# Patient Record
Sex: FEMALE | Race: WHITE | NOT HISPANIC OR LATINO | Employment: OTHER | URBAN - NONMETROPOLITAN AREA
[De-identification: names, ages, dates, MRNs, and addresses within clinical notes are randomized per-mention and may not be internally consistent; named-entity substitution may affect disease eponyms.]

---

## 2018-03-30 ENCOUNTER — OFFICE VISIT CONVERTED (OUTPATIENT)
Dept: FAMILY MEDICINE CLINIC | Age: 63
End: 2018-03-30
Attending: FAMILY MEDICINE

## 2018-06-25 ENCOUNTER — OFFICE VISIT CONVERTED (OUTPATIENT)
Dept: FAMILY MEDICINE CLINIC | Age: 63
End: 2018-06-25
Attending: FAMILY MEDICINE

## 2018-12-03 ENCOUNTER — OFFICE VISIT CONVERTED (OUTPATIENT)
Dept: FAMILY MEDICINE CLINIC | Age: 63
End: 2018-12-03
Attending: FAMILY MEDICINE

## 2018-12-28 ENCOUNTER — OFFICE VISIT CONVERTED (OUTPATIENT)
Dept: FAMILY MEDICINE CLINIC | Age: 63
End: 2018-12-28
Attending: NURSE PRACTITIONER

## 2019-03-04 ENCOUNTER — HOSPITAL ENCOUNTER (OUTPATIENT)
Dept: OTHER | Facility: HOSPITAL | Age: 64
Discharge: HOME | End: 2019-03-04

## 2019-03-04 ENCOUNTER — OFFICE VISIT CONVERTED (OUTPATIENT)
Dept: FAMILY MEDICINE CLINIC | Age: 64
End: 2019-03-04
Attending: FAMILY MEDICINE

## 2019-05-07 ENCOUNTER — OFFICE VISIT CONVERTED (OUTPATIENT)
Dept: NEUROLOGY | Facility: CLINIC | Age: 64
End: 2019-05-07
Attending: PSYCHIATRY & NEUROLOGY

## 2019-05-07 ENCOUNTER — CONVERSION ENCOUNTER (OUTPATIENT)
Dept: NEUROLOGY | Facility: CLINIC | Age: 64
End: 2019-05-07

## 2019-05-24 ENCOUNTER — HOSPITAL ENCOUNTER (OUTPATIENT)
Dept: OTHER | Facility: HOSPITAL | Age: 64
Discharge: HOME | End: 2019-05-24

## 2019-05-24 LAB
ALBUMIN SERPL-MCNC: 4.3 G/DL (ref 3.5–5)
ALBUMIN/GLOB SERPL: 1.5 {RATIO} (ref 1.4–2.6)
ALP SERPL-CCNC: 57 U/L (ref 43–160)
ALT SERPL-CCNC: 21 U/L (ref 10–40)
ANION GAP SERPL CALC-SCNC: 19 MMOL/L (ref 8–19)
AST SERPL-CCNC: 20 U/L (ref 15–50)
BASOPHILS # BLD AUTO: 0.05 10*3/UL (ref 0–0.2)
BASOPHILS NFR BLD AUTO: 0.8 % (ref 0–3)
BILIRUB SERPL-MCNC: 0.47 MG/DL (ref 0.2–1.3)
BUN SERPL-MCNC: 23 MG/DL (ref 5–25)
BUN/CREAT SERPL: 26 {RATIO} (ref 6–20)
CALCIUM SERPL-MCNC: 9.7 MG/DL (ref 8.7–10.4)
CHLORIDE SERPL-SCNC: 105 MMOL/L (ref 99–111)
CONV ABS IMM GRAN: 0.02 10*3/UL (ref 0–0.2)
CONV CO2: 24 MMOL/L (ref 22–32)
CONV IMMATURE GRAN: 0.3 % (ref 0–1.8)
CONV TOTAL PROTEIN: 7.2 G/DL (ref 6.3–8.2)
CREAT UR-MCNC: 0.88 MG/DL (ref 0.5–0.9)
DEPRECATED RDW RBC AUTO: 40.9 FL (ref 36.4–46.3)
EOSINOPHIL # BLD AUTO: 0.15 10*3/UL (ref 0–0.7)
EOSINOPHIL # BLD AUTO: 2.3 % (ref 0–7)
ERYTHROCYTE [DISTWIDTH] IN BLOOD BY AUTOMATED COUNT: 12.3 % (ref 11.7–14.4)
GFR SERPLBLD BASED ON 1.73 SQ M-ARVRAT: >60 ML/MIN/{1.73_M2}
GLOBULIN UR ELPH-MCNC: 2.9 G/DL (ref 2–3.5)
GLUCOSE SERPL-MCNC: 153 MG/DL (ref 65–99)
HBA1C MFR BLD: 13.4 G/DL (ref 12–16)
HCT VFR BLD AUTO: 41.2 % (ref 37–47)
HCYS SERPL-SCNC: 12.3 UMOL/L (ref 3.7–13.9)
LYMPHOCYTES # BLD AUTO: 2.64 10*3/UL (ref 1–5)
MCH RBC QN AUTO: 29.5 PG (ref 27–31)
MCHC RBC AUTO-ENTMCNC: 32.5 G/DL (ref 33–37)
MCV RBC AUTO: 90.5 FL (ref 81–99)
MONOCYTES # BLD AUTO: 0.47 10*3/UL (ref 0.2–1.2)
MONOCYTES NFR BLD AUTO: 7.1 % (ref 3–10)
NEUTROPHILS # BLD AUTO: 3.3 10*3/UL (ref 2–8)
NEUTROPHILS NFR BLD AUTO: 49.7 % (ref 30–85)
NRBC CBCN: 0 % (ref 0–0.7)
OSMOLALITY SERPL CALC.SUM OF ELEC: 305 MOSM/KG (ref 273–304)
PLATELET # BLD AUTO: 268 10*3/UL (ref 130–400)
PMV BLD AUTO: 10.7 FL (ref 9.4–12.3)
POTASSIUM SERPL-SCNC: 3.8 MMOL/L (ref 3.5–5.3)
RBC # BLD AUTO: 4.55 10*6/UL (ref 4.2–5.4)
SODIUM SERPL-SCNC: 144 MMOL/L (ref 135–147)
TSH SERPL-ACNC: 1.55 M[IU]/L (ref 0.27–4.2)
VARIANT LYMPHS NFR BLD MANUAL: 39.8 % (ref 20–45)
VIT B12 SERPL-MCNC: 310 PG/ML (ref 211–911)
WBC # BLD AUTO: 6.63 10*3/UL (ref 4.8–10.8)

## 2019-09-06 ENCOUNTER — OFFICE VISIT CONVERTED (OUTPATIENT)
Dept: FAMILY MEDICINE CLINIC | Age: 64
End: 2019-09-06
Attending: FAMILY MEDICINE

## 2019-09-06 ENCOUNTER — HOSPITAL ENCOUNTER (OUTPATIENT)
Dept: OTHER | Facility: HOSPITAL | Age: 64
Discharge: HOME | End: 2019-09-06
Attending: FAMILY MEDICINE

## 2019-09-06 LAB
25(OH)D3 SERPL-MCNC: 33.6 NG/ML (ref 30–100)
ALBUMIN SERPL-MCNC: 4.4 G/DL (ref 3.5–5)
ALBUMIN/GLOB SERPL: 1.6 {RATIO} (ref 1.4–2.6)
ALP SERPL-CCNC: 58 U/L (ref 43–160)
ALT SERPL-CCNC: 24 U/L (ref 10–40)
ANION GAP SERPL CALC-SCNC: 18 MMOL/L (ref 8–19)
AST SERPL-CCNC: 20 U/L (ref 15–50)
BASOPHILS # BLD MANUAL: 0.03 10*3/UL (ref 0–0.2)
BASOPHILS NFR BLD MANUAL: 0.4 % (ref 0–3)
BILIRUB SERPL-MCNC: 0.44 MG/DL (ref 0.2–1.3)
BUN SERPL-MCNC: 19 MG/DL (ref 5–25)
BUN/CREAT SERPL: 23 {RATIO} (ref 6–20)
CALCIUM SERPL-MCNC: 9.8 MG/DL (ref 8.7–10.4)
CHLORIDE SERPL-SCNC: 104 MMOL/L (ref 99–111)
CHOLEST SERPL-MCNC: 166 MG/DL (ref 107–200)
CHOLEST/HDLC SERPL: 3.2 {RATIO} (ref 3–6)
CONV CO2: 25 MMOL/L (ref 22–32)
CONV TOTAL PROTEIN: 7.1 G/DL (ref 6.3–8.2)
CREAT UR-MCNC: 0.82 MG/DL (ref 0.5–0.9)
DEPRECATED RDW RBC AUTO: 39.6 FL
EOSINOPHIL # BLD MANUAL: 0.2 10*3/UL (ref 0–0.7)
EOSINOPHIL NFR BLD MANUAL: 2.9 % (ref 0–7)
ERYTHROCYTE [DISTWIDTH] IN BLOOD BY AUTOMATED COUNT: 12 % (ref 11.5–14.5)
GFR SERPLBLD BASED ON 1.73 SQ M-ARVRAT: >60 ML/MIN/{1.73_M2}
GLOBULIN UR ELPH-MCNC: 2.7 G/DL (ref 2–3.5)
GLUCOSE SERPL-MCNC: 142 MG/DL (ref 65–99)
GRANS (ABSOLUTE): 3.03 10*3/UL (ref 2–8)
GRANS: 44.3 % (ref 30–85)
HBA1C MFR BLD: 13.7 G/DL (ref 12–16)
HCT VFR BLD AUTO: 40.5 % (ref 37–47)
HDLC SERPL-MCNC: 52 MG/DL (ref 40–60)
IMM GRANULOCYTES # BLD: 0.02 10*3/UL (ref 0–0.54)
IMM GRANULOCYTES NFR BLD: 0.3 % (ref 0–0.43)
LDLC SERPL CALC-MCNC: 86 MG/DL (ref 70–100)
LYMPHOCYTES # BLD MANUAL: 3.02 10*3/UL (ref 1–5)
LYMPHOCYTES NFR BLD MANUAL: 7.9 % (ref 3–10)
MCH RBC QN AUTO: 29.9 PG (ref 27–31)
MCHC RBC AUTO-ENTMCNC: 33.8 G/DL (ref 33–37)
MCV RBC AUTO: 88.4 FL (ref 81–99)
MONOCYTES # BLD AUTO: 0.54 10*3/UL (ref 0.2–1.2)
OSMOLALITY SERPL CALC.SUM OF ELEC: 301 MOSM/KG (ref 273–304)
PLATELET # BLD AUTO: 289 10*3/UL (ref 130–400)
PMV BLD AUTO: 10.2 FL (ref 7.4–10.4)
POTASSIUM SERPL-SCNC: 4 MMOL/L (ref 3.5–5.3)
RBC # BLD AUTO: 4.58 10*6/UL (ref 4.2–5.4)
SODIUM SERPL-SCNC: 143 MMOL/L (ref 135–147)
TRIGL SERPL-MCNC: 141 MG/DL (ref 40–150)
TSH SERPL-ACNC: 1.9 M[IU]/L (ref 0.27–4.2)
VARIANT LYMPHS NFR BLD MANUAL: 44.2 % (ref 20–45)
VLDLC SERPL-MCNC: 28 MG/DL (ref 5–37)
WBC # BLD AUTO: 6.84 10*3/UL (ref 4.8–10.8)

## 2019-10-09 ENCOUNTER — OFFICE VISIT CONVERTED (OUTPATIENT)
Dept: NEUROLOGY | Facility: CLINIC | Age: 64
End: 2019-10-09
Attending: PSYCHIATRY & NEUROLOGY

## 2019-10-10 ENCOUNTER — HOSPITAL ENCOUNTER (OUTPATIENT)
Dept: OTHER | Facility: HOSPITAL | Age: 64
Discharge: HOME | End: 2019-10-10
Attending: FAMILY MEDICINE

## 2019-10-10 LAB
ANION GAP SERPL CALC-SCNC: 18 MMOL/L (ref 8–19)
BUN SERPL-MCNC: 20 MG/DL (ref 5–25)
BUN/CREAT SERPL: 22 {RATIO} (ref 6–20)
CALCIUM SERPL-MCNC: 9.9 MG/DL (ref 8.7–10.4)
CHLORIDE SERPL-SCNC: 105 MMOL/L (ref 99–111)
CONV CO2: 24 MMOL/L (ref 22–32)
CREAT UR-MCNC: 0.89 MG/DL (ref 0.5–0.9)
EST. AVERAGE GLUCOSE BLD GHB EST-MCNC: 148 MG/DL
GFR SERPLBLD BASED ON 1.73 SQ M-ARVRAT: >60 ML/MIN/{1.73_M2}
GLUCOSE SERPL-MCNC: 146 MG/DL (ref 65–99)
HBA1C MFR BLD: 6.8 % (ref 3.5–5.7)
OSMOLALITY SERPL CALC.SUM OF ELEC: 301 MOSM/KG (ref 273–304)
POTASSIUM SERPL-SCNC: 4 MMOL/L (ref 3.5–5.3)
SODIUM SERPL-SCNC: 143 MMOL/L (ref 135–147)

## 2019-12-23 ENCOUNTER — HOSPITAL ENCOUNTER (OUTPATIENT)
Dept: OTHER | Facility: HOSPITAL | Age: 64
Discharge: HOME | End: 2019-12-23

## 2019-12-23 LAB
ANION GAP SERPL CALC-SCNC: 18 MMOL/L (ref 8–19)
BUN SERPL-MCNC: 15 MG/DL (ref 5–25)
BUN/CREAT SERPL: 18 {RATIO} (ref 6–20)
CALCIUM SERPL-MCNC: 10 MG/DL (ref 8.7–10.4)
CHLORIDE SERPL-SCNC: 103 MMOL/L (ref 99–111)
CONV CO2: 27 MMOL/L (ref 22–32)
CREAT BLD-MCNC: 0.9 MG/DL (ref 0.6–1.4)
CREAT UR-MCNC: 0.84 MG/DL (ref 0.5–0.9)
GFR SERPLBLD BASED ON 1.73 SQ M-ARVRAT: >60 ML/MIN/{1.73_M2}
GFR SERPLBLD BASED ON 1.73 SQ M-ARVRAT: >60 ML/MIN/{1.73_M2}
GLUCOSE SERPL-MCNC: 123 MG/DL (ref 65–99)
OSMOLALITY SERPL CALC.SUM OF ELEC: 300 MOSM/KG (ref 273–304)
POTASSIUM SERPL-SCNC: 4.1 MMOL/L (ref 3.5–5.3)
SODIUM SERPL-SCNC: 144 MMOL/L (ref 135–147)

## 2020-01-16 ENCOUNTER — HOSPITAL ENCOUNTER (OUTPATIENT)
Dept: DIABETES SERVICES | Facility: HOSPITAL | Age: 65
Setting detail: RECURRING SERIES
Discharge: HOME | End: 2020-01-31
Attending: FAMILY MEDICINE

## 2020-01-16 ENCOUNTER — OFFICE VISIT CONVERTED (OUTPATIENT)
Dept: FAMILY MEDICINE CLINIC | Age: 65
End: 2020-01-16
Attending: FAMILY MEDICINE

## 2020-01-17 ENCOUNTER — OFFICE VISIT CONVERTED (OUTPATIENT)
Dept: FAMILY MEDICINE CLINIC | Age: 65
End: 2020-01-17
Attending: NURSE PRACTITIONER

## 2020-02-29 ENCOUNTER — HOSPITAL ENCOUNTER (OUTPATIENT)
Dept: OTHER | Facility: HOSPITAL | Age: 65
Discharge: HOME | End: 2020-02-29
Attending: FAMILY MEDICINE

## 2020-02-29 LAB
ALBUMIN SERPL-MCNC: 4.3 G/DL (ref 3.5–5)
ALBUMIN/GLOB SERPL: 1.5 {RATIO} (ref 1.4–2.6)
ALP SERPL-CCNC: 52 U/L (ref 43–160)
ALT SERPL-CCNC: 20 U/L (ref 10–40)
ANION GAP SERPL CALC-SCNC: 17 MMOL/L (ref 8–19)
AST SERPL-CCNC: 18 U/L (ref 15–50)
BILIRUB SERPL-MCNC: 0.39 MG/DL (ref 0.2–1.3)
BUN SERPL-MCNC: 17 MG/DL (ref 5–25)
BUN/CREAT SERPL: 20 {RATIO} (ref 6–20)
CALCIUM SERPL-MCNC: 9.4 MG/DL (ref 8.7–10.4)
CHLORIDE SERPL-SCNC: 104 MMOL/L (ref 99–111)
CHOLEST SERPL-MCNC: 136 MG/DL (ref 107–200)
CHOLEST/HDLC SERPL: 2.5 {RATIO} (ref 3–6)
CONV CO2: 26 MMOL/L (ref 22–32)
CONV CREATININE URINE, RANDOM: 70.4 MG/DL (ref 10–300)
CONV MICROALBUM.,U,RANDOM: <12 MG/L (ref 0–20)
CONV TOTAL PROTEIN: 7.1 G/DL (ref 6.3–8.2)
CREAT UR-MCNC: 0.83 MG/DL (ref 0.5–0.9)
EST. AVERAGE GLUCOSE BLD GHB EST-MCNC: 148 MG/DL
GFR SERPLBLD BASED ON 1.73 SQ M-ARVRAT: >60 ML/MIN/{1.73_M2}
GLOBULIN UR ELPH-MCNC: 2.8 G/DL (ref 2–3.5)
GLUCOSE SERPL-MCNC: 125 MG/DL (ref 65–99)
HBA1C MFR BLD: 6.8 % (ref 3.5–5.7)
HDLC SERPL-MCNC: 55 MG/DL (ref 40–60)
LDLC SERPL CALC-MCNC: 64 MG/DL (ref 70–100)
MICROALBUMIN/CREAT UR: 17 MG/G{CRE} (ref 0–35)
OSMOLALITY SERPL CALC.SUM OF ELEC: 299 MOSM/KG (ref 273–304)
POTASSIUM SERPL-SCNC: 3.9 MMOL/L (ref 3.5–5.3)
SODIUM SERPL-SCNC: 143 MMOL/L (ref 135–147)
TRIGL SERPL-MCNC: 87 MG/DL (ref 40–150)
VLDLC SERPL-MCNC: 17 MG/DL (ref 5–37)

## 2020-04-09 ENCOUNTER — TELEMEDICINE CONVERTED (OUTPATIENT)
Dept: NEUROLOGY | Facility: CLINIC | Age: 65
End: 2020-04-09
Attending: PSYCHIATRY & NEUROLOGY

## 2020-07-16 ENCOUNTER — OFFICE VISIT CONVERTED (OUTPATIENT)
Dept: FAMILY MEDICINE CLINIC | Age: 65
End: 2020-07-16
Attending: FAMILY MEDICINE

## 2020-07-16 ENCOUNTER — HOSPITAL ENCOUNTER (OUTPATIENT)
Dept: OTHER | Facility: HOSPITAL | Age: 65
Discharge: HOME | End: 2020-07-16
Attending: FAMILY MEDICINE

## 2020-07-16 LAB
ALBUMIN SERPL-MCNC: 4.3 G/DL (ref 3.5–5)
ALBUMIN/GLOB SERPL: 1.7 {RATIO} (ref 1.4–2.6)
ALP SERPL-CCNC: 47 U/L (ref 43–160)
ALT SERPL-CCNC: 19 U/L (ref 10–40)
ANION GAP SERPL CALC-SCNC: 13 MMOL/L (ref 8–19)
AST SERPL-CCNC: 16 U/L (ref 15–50)
BILIRUB SERPL-MCNC: 0.49 MG/DL (ref 0.2–1.3)
BUN SERPL-MCNC: 16 MG/DL (ref 5–25)
BUN/CREAT SERPL: 19 {RATIO} (ref 6–20)
CALCIUM SERPL-MCNC: 9.6 MG/DL (ref 8.7–10.4)
CHLORIDE SERPL-SCNC: 105 MMOL/L (ref 99–111)
CHOLEST SERPL-MCNC: 157 MG/DL (ref 107–200)
CHOLEST/HDLC SERPL: 3 {RATIO} (ref 3–6)
CONV CO2: 27 MMOL/L (ref 22–32)
CONV TOTAL PROTEIN: 6.8 G/DL (ref 6.3–8.2)
CREAT UR-MCNC: 0.83 MG/DL (ref 0.5–0.9)
EST. AVERAGE GLUCOSE BLD GHB EST-MCNC: 140 MG/DL
GFR SERPLBLD BASED ON 1.73 SQ M-ARVRAT: >60 ML/MIN/{1.73_M2}
GLOBULIN UR ELPH-MCNC: 2.5 G/DL (ref 2–3.5)
GLUCOSE SERPL-MCNC: 124 MG/DL (ref 65–99)
HBA1C MFR BLD: 6.5 % (ref 3.5–5.7)
HDLC SERPL-MCNC: 53 MG/DL (ref 40–60)
LDLC SERPL CALC-MCNC: 83 MG/DL (ref 70–100)
OSMOLALITY SERPL CALC.SUM OF ELEC: 295 MOSM/KG (ref 273–304)
POTASSIUM SERPL-SCNC: 3.7 MMOL/L (ref 3.5–5.3)
SODIUM SERPL-SCNC: 141 MMOL/L (ref 135–147)
TRIGL SERPL-MCNC: 106 MG/DL (ref 40–150)
VLDLC SERPL-MCNC: 21 MG/DL (ref 5–37)

## 2021-02-12 ENCOUNTER — HOSPITAL ENCOUNTER (OUTPATIENT)
Dept: OTHER | Facility: HOSPITAL | Age: 66
Discharge: HOME | End: 2021-02-12
Attending: FAMILY MEDICINE

## 2021-02-12 ENCOUNTER — OFFICE VISIT CONVERTED (OUTPATIENT)
Dept: FAMILY MEDICINE CLINIC | Age: 66
End: 2021-02-12
Attending: FAMILY MEDICINE

## 2021-02-12 LAB
25(OH)D3 SERPL-MCNC: 30 NG/ML (ref 30–100)
ALBUMIN SERPL-MCNC: 4.2 G/DL (ref 3.5–5)
ALBUMIN/GLOB SERPL: 1.6 {RATIO} (ref 1.4–2.6)
ALP SERPL-CCNC: 54 U/L (ref 43–160)
ALT SERPL-CCNC: 19 U/L (ref 10–40)
ANION GAP SERPL CALC-SCNC: 13 MMOL/L (ref 8–19)
AST SERPL-CCNC: 16 U/L (ref 15–50)
BILIRUB SERPL-MCNC: 0.36 MG/DL (ref 0.2–1.3)
BUN SERPL-MCNC: 18 MG/DL (ref 5–25)
BUN/CREAT SERPL: 23 {RATIO} (ref 6–20)
CALCIUM SERPL-MCNC: 9.3 MG/DL (ref 8.7–10.4)
CHLORIDE SERPL-SCNC: 105 MMOL/L (ref 99–111)
CHOLEST SERPL-MCNC: 157 MG/DL (ref 107–200)
CHOLEST/HDLC SERPL: 2.6 {RATIO} (ref 3–6)
CONV CO2: 28 MMOL/L (ref 22–32)
CONV TOTAL PROTEIN: 6.9 G/DL (ref 6.3–8.2)
CREAT UR-MCNC: 0.78 MG/DL (ref 0.5–0.9)
ERYTHROCYTE [DISTWIDTH] IN BLOOD BY AUTOMATED COUNT: 12.6 % (ref 11.5–14.5)
EST. AVERAGE GLUCOSE BLD GHB EST-MCNC: 151 MG/DL
GFR SERPLBLD BASED ON 1.73 SQ M-ARVRAT: >60 ML/MIN/{1.73_M2}
GLOBULIN UR ELPH-MCNC: 2.7 G/DL (ref 2–3.5)
GLUCOSE SERPL-MCNC: 141 MG/DL (ref 65–99)
HBA1C MFR BLD: 14.1 G/DL (ref 12–16)
HBA1C MFR BLD: 6.9 % (ref 3.5–5.7)
HCT VFR BLD AUTO: 41.7 % (ref 37–47)
HDLC SERPL-MCNC: 60 MG/DL (ref 40–60)
LDLC SERPL CALC-MCNC: 72 MG/DL (ref 70–100)
MCH RBC QN AUTO: 29.7 PG (ref 27–31)
MCHC RBC AUTO-ENTMCNC: 33.8 G/DL (ref 33–37)
MCV RBC AUTO: 88 FL (ref 81–99)
OSMOLALITY SERPL CALC.SUM OF ELEC: 298 MOSM/KG (ref 273–304)
PLATELET # BLD AUTO: 269 10*3/UL (ref 130–400)
PMV BLD AUTO: 9.9 FL (ref 7.4–10.4)
POTASSIUM SERPL-SCNC: 3.7 MMOL/L (ref 3.5–5.3)
RBC # BLD AUTO: 4.74 10*6/UL (ref 4.2–5.4)
SODIUM SERPL-SCNC: 142 MMOL/L (ref 135–147)
TRIGL SERPL-MCNC: 127 MG/DL (ref 40–150)
TSH SERPL-ACNC: 1.3 M[IU]/L (ref 0.27–4.2)
VLDLC SERPL-MCNC: 25 MG/DL (ref 5–37)
WBC # BLD AUTO: 6.92 10*3/UL (ref 4.8–10.8)

## 2021-02-13 LAB — HCV AB S/CO SERPL IA: <0.1 S/CO RATIO (ref 0–0.9)

## 2021-05-12 NOTE — PROGRESS NOTES
Progress Note      Patient Name: Rivka Casillas   Patient ID: [id number]   Sex: Female   YOB: 1955    Primary Care Provider: Jono Lowe MD   Referring Provider: Jono Lowe MD    Visit Date: April 9, 2020    Provider: Sam Johnson MD   Location: [facility]   Location Address: [address] [place] [address]  [place]  [id number]   Location Phone: [phone]          Chief Complaint     6 mo f/u via Zoom for light headedness, abn MRI, memory change, aneurysm. Prior pt of Dr. Soria.       History Of Present Illness  Rivka Casillas is a 64 year old female who presents today to [facility] today referred from Jono Lowe MD.      64-year-old woman here for follow-up of her CT angiogram of the brain.  It is unremarkable.  There is no stenosis in the internal carotid artery or the intracerebral arteries.  She states that she is no longer having any dizzy spells getting up.  She states that she has been followed by cardiology and they told her that she has an irregular heartbeat but they have not given her any medications for it.  She is borderline diabetic and she is watching her carbs as well as her sugars.  She feels much better.  She has no symptoms at this time and no other questions to ask me.       Past Medical History  Arthritis; High blood pressure; Palpitations         Past Surgical History  Ceasarian section; Tonsilectomy; Tubal ligation         Medication List  calcium citrate 250 mg calcium oral tablet; fenofibrate 150 mg oral capsule; ibuprofen 400 mg oral tablet; lisinopril-hydrochlorothiazide 20-25 mg oral tablet; meloxicam 15 mg oral tablet; Vitamin C With Lucretia Hips 1,000 mg oral tablet; Vitamin D3 2,000 unit oral capsule         Allergy List  NO KNOWN DRUG ALLERGIES         Family Medical History  Cancer, Unspecified; Arthrtis; Family history of Arthritis; Family history of cancer         Social History  Alcohol (Never); Denies substance abuse; ;  Tobacco (Never); Working         Review of Systems  · Constitutional  o Denies  o : chills, excessive sweating, fatigue, fever, sycope/passing out, weight gain, weight loss  · Eyes  o Denies  o : changes in vision, blurry vision, double vision  · HENT  o Denies  o : loss of hearing, ringing in the ears, ear aches, sore throat, nasal congestion, sinus pain, nose bleeds, seasonal allergies  · Cardiovascular  o Denies  o : blood clots, swollen legs, anemia, easy burising or bleeding, transfusions  · Respiratory  o Denies  o : shortness of breath, dry cough, productive cough, pneumonia, COPD  · Gastrointestinal  o Denies  o : difficulty swallowing, reflux  · Genitourinary  o Denies  o : incontinence  · Neurologic  o Denies  o : headache, seizure, stroke, tremor, loss of balance, falls, dizziness/vertigo, difficulty with sleep, numbness/tingling/paresthesia , difficulty with coordination, difficulty with dexterity, weakness  · Musculoskeletal  o Denies  o : neck stiffness/pain, swollen lymph nodes, muscle aches, joint pain, weakness, spasms, sciatica, pain radiating in arm, pain radiating in leg, low back pain  · Endocrine  o Denies  o : diabetes, thyroid disorder  · Psychiatric  o Denies  o : anxiety, depression      Physical Examination     She is alert, fluent, phasic, follows commands well.  She is able to ambulate with any difficulty           Assessment  · Dizziness     780.4/R42  She is to follow-up in our office in the future as needed. Should she have any problems she is to call us.    Problems Reconciled  Plan  · Medications  o Medications have been Reconciled  o Transition of Care or Provider Policy  · Instructions  o Encouraged to follow-up with Primary Care Provider for preventative care.  o Follow Up PRN.            Electronically Signed by: Sam Johnson MD -Author on April 9, 2020 10:15:37 AM

## 2021-05-15 VITALS
DIASTOLIC BLOOD PRESSURE: 55 MMHG | BODY MASS INDEX: 38.62 KG/M2 | HEART RATE: 77 BPM | SYSTOLIC BLOOD PRESSURE: 142 MMHG | WEIGHT: 218 LBS | HEIGHT: 63 IN

## 2021-05-15 VITALS
DIASTOLIC BLOOD PRESSURE: 56 MMHG | SYSTOLIC BLOOD PRESSURE: 133 MMHG | HEART RATE: 74 BPM | WEIGHT: 221.19 LBS | BODY MASS INDEX: 39.19 KG/M2 | HEIGHT: 63 IN

## 2021-05-18 NOTE — PROGRESS NOTES
Rivka Casillas  1955     Office/Outpatient Visit    Visit Date: Fri, Jan 17, 2020 08:31 am    Provider: Belen Rascon N.P. (Assistant: Spurling, Sarah C, MA)    Location: [facility]        Electronically signed by Belen Rascon N.P. on  01/19/2020 07:05:21 PM                             Subjective:        CC: Mrs. Casillas is a 64 year old White female.  Well woman exam.;         HPI: seen with maryan limon student          Her last gynecological exam was two years ago.  The patient's LMP 12 years ago.  Her last Pap smear was 1 year ago and was normal.  Her last mammogram was 1 year ago and was normal.  DEXA scan less than a year ago.  She is not currently using any form of contraception.  She performs breast self-exams occasionally.            PHQ-9 Depression Screening: Completed form scanned and in chart; Total Score 2     ROS:     CONSTITUTIONAL:  Negative for chills, fatigue, fever, and weight change.      EYES:  Negative for blurred vision, eye pain, and photophobia.      E/N/T:  Negative for hearing problems, E/N/T pain, congestion, rhinorrhea, epistaxis, hoarseness, and dental problems.      CARDIOVASCULAR:  Negative for chest pain, palpitations, tachycardia, orthopnea, and edema.      RESPIRATORY:  Negative for cough, dyspnea, and hemoptysis.      GASTROINTESTINAL:  Negative for abdominal pain, heartburn, constipation, diarrhea, and stool changes.      GENITOURINARY:  Negative for genital lesions, hematuria, menstrual problems, polyuria, abnormal vaginal bleeding, and vaginal discharge.      MUSCULOSKELETAL:  Negative for arthralgias, back pain, and myalgias.      INTEGUMENTARY/BREAST:  Negative for atypical moles, dry skin, pruritis, rashes, breast masses, and nipple discharge.      NEUROLOGICAL:  Negative for dizziness, headaches, paresthesias, and weakness.      HEMATOLOGIC/LYMPHATIC:  Negative for easy bruising, bleeding, and lymphadenopathy.      ENDOCRINE:  Negative  for hair loss, heat/cold intolerance, polydipsia, and polyphagia.      ALLERGIC/IMMUNOLOGIC:  Negative for allergies, frequent illnesses, HIV exposure, and urticaria.      PSYCHIATRIC:  Negative for anxiety, depression, and sleep disturbances.          Past Medical History / Family History / Social History:         Last Reviewed on 2020 10:20 AM by Jono Lowe    Past Medical History:             PREVENTIVE HEALTH MAINTENANCE             BONE DENSITY: was last done 2019 with normal results     COLORECTAL CANCER SCREENING: Up to date (colonoscopy q10y; sigmoidoscopy q5y; Cologuard q3y) was last done 2018, Results are in chart; colonoscopy with normal results     DENTAL CLEANING: was last done      EYE EXAM: was last done 2019     MAMMOGRAM: Done within last 2 years and results in are chart was last done  with normal results     PAP SMEAR: was last done 2018 with normal results             PAST MEDICAL HISTORY             GYNECOLOGICAL HISTORY:             CURRENT MEDICAL PROVIDERS:    Neurologist    Orthopedist         Surgical History:     Surgeries:    Tonsillectomy/Adenoidectomy Other Surgeries     section: X 1;     Dilation and Curettage    Bilateral Tubal Ligation         Family History:         Positive for Breast Cancer.      Positive for Type 2 Diabetes.          Social History:         Marital Status:          Tobacco/Alcohol/Supplements:     Last Reviewed on 2020 10:20 AM by Jono Lowe    Tobacco: She has never smoked.  Non-drinker     Caffeine:  She admits to consuming caffeine via -LITTLE.          Substance Abuse History:     Last Reviewed on 2020 10:20 AM by Jono Lowe    None         Current Problems:     Last Reviewed on 2020 08:31 AM by Spurling, Sarah C    Essential (primary) hypertension    Personal history of colonic polyps    Pure hyperglyceridemia    Vitamin D deficiency, unspecified    Gastro-esophageal  reflux disease without esophagitis    Dizziness and giddiness    Hereditary and idiopathic neuropathy, unspecified    Type 2 diabetes mellitus without complications        Immunizations:     Hep A, adult dose 12/28/2018    Hep A, adult dose 7/13/2019    Adacel (Tdap) 7/10/2014        Allergies:     Last Reviewed on 1/16/2020 10:20 AM by Jono Lowe    No Known Allergies.        Current Medications:     Last Reviewed on 1/17/2020 08:37 AM by Spurling, Sarah C    Lisinopril/Hydrochlorothiazide 20mg/25mg Tablet [one a day]    Fenofibrate 145mg Tablet [One PO QD with a meal]    Calcium Carbonate/Vitamin D  600mg/800IU Tablet [Take 1 tablet(s) by mouth daily]    Vitamin D3 2,000IU Capsules [1 capsule daily]    Meloxicam 15mg Tablet [1 tab daily]    Omeprazole 40mg Capsules, Extended Release [1 capsule daily PRN]    Vitamin C 250mg Chewable Tablet [2 gummies daily]        Objective:        Vitals:         Current: 1/17/2020 8:43:33 AM    Ht:  5 ft, 3 in;  Wt: 211.4 lbs;  BMI: 37.4T: 98.6 F (oral);  BP: 134/50 mm Hg (left arm, sitting);  P: 85 bpm (left arm (BP Cuff), sitting);  sCr: 0.89 mg/dL;  GFR: 72.68        Exams:     PHYSICAL EXAM:     GENERAL: vital signs recorded - well developed, well nourished;  no apparent distress;     NECK: range of motion is normal; thyroid is non-palpable;     RESPIRATORY: normal respiratory rate and pattern with no distress; normal breath sounds with no rales, rhonchi, wheezes or rubs;     CARDIOVASCULAR: normal rate; rhythm is regular;  no systolic murmur; no edema;     GASTROINTESTINAL: no organomegaly;     GENITOURINARY: external genitalia: normal without lesions or urethral abnormalities;;  vagina: normal with good pelvic support and no lesions or discharge;;  cervix: normal appearance without lesions or discharge;;  uterus: normal size and position, well-supported;;  adnexa: normal with no masses or tenderness; Chaperone: maryan limon student     LYMPHATIC: no  axillary adenopathy;     BREAST/INTEGUMENT: BREASTS: breast exam is normal without masses, skin changes, or nipple discharge; SKIN: no significant rashes or lesions; no suspicious moles;     MUSCULOSKELETAL:  Normal range of motion, strength and tone;     NEUROLOGIC: mental status: alert and oriented x 3; GROSSLY INTACT     PSYCHIATRIC:  appropriate affect and demeanor; normal speech pattern; grossly normal memory;         Lab/Test Results:         Glucose, Urine: Neg (01/17/2020),     Bilirubin, urine: Negative (01/17/2020),     Ketones, Urine Strip: Negative (01/17/2020),     Specific Gravity, urine: 1.025 (01/17/2020),     Blood in Urine: negative (01/17/2020),     pH, urine: 6.5 (01/17/2020),     Protein Urine QL: negative (01/17/2020),     Urobilinogen, urine: 0.2 E.U./dL (01/17/2020),     Nitrite, Urine: Negative (01/17/2020),     Leukoctyes, urine: Negative (01/17/2020),     Appearance: Clear (01/17/2020),     collection source: Clean-catch (01/17/2020),     Color: Yellow (01/17/2020),     Performed by:: NAILA (01/17/2020),             Assessment:         Z01.419   Encounter for gynecological examination (general) (routine) without abnormal findings       Z13.31   Encounter for screening for depression       Z12.31   Encounter for screening mammogram for malignant neoplasm of breast           ORDERS:         Radiology/Test Orders:       [id number]  Screening digital breast tomosynthesis bi  (Send-Out)            3017F  Colorectal CA screen results documented and reviewed (PV)  (In-House)              Lab Orders:       [id number]  Urinalysis, automated, without microscopy  (In-House)              Other Orders:         Depression screen negative  (In-House)              Screening mammogram results documented  (Send-Out)                      Plan:         Encounter for gynecological examination (general) (routine) without abnormal findings        COUNSELING was provided today regarding the following topics: breast  self-exam, STD prevention, abnormal post-menopausal vaginal bleeding, and last pap negative dec 2018.  has been more than 15 yrs since last abnormal pap.  normal pelvic exam but pap test not done as per ACOG guidelines.  she is asymptomatic..            Orders:       [id number]  Urinalysis, automated, without microscopy  (In-House)              Encounter for screening for depression    MIPS PHQ-9 Depression Screening: Completed form scanned and in chart; Total Score 2; Negative Depression Screen           Orders:         Depression screen negative  (In-House)              Screening mammogram results documented  (Send-Out)            3017F  Colorectal CA screen results documented and reviewed (PV)  (In-House)              Encounter for screening mammogram for malignant neoplasm of breast          Orders:       [id number]  Screening digital breast tomosynthesis bi  (Send-Out)                  Patient Recommendations:        For  Encounter for gynecological examination (general) (routine) without abnormal findings:        You should regularly examine your breasts, easily done while in the shower or with lotion.  Feel and look for differences in consistency from month to month, especially noting knots or lumps, changes in skin appearance, nipple retraction or discharge.    Sexually transmitted diseases may be prevented by abstaining from sexual activity or avoiding sexual contact with high risk partners, and consistently using a condom or female barrier contraceptives plus spermacide.    Unless you are taking estrogen replacement in a cyclical fashion, where you are expected to have a period every month, post-menopausal bleeding is not normal, and may signify a serious problem such as endometrial cancer. Call your doctor if this occurs!              Charge Capture:         Primary Diagnosis:     Z01.419  Encounter for gynecological examination (general) (routine) without abnormal findings           Orders:      [id number]   Preventive medicine, established patient, age 40-64 years  (In-House)            50924  Urinalysis, automated, without microscopy  (In-House)              Z13.31  Encounter for screening for depression           Orders:        Depression screen negative  (In-House)            3017F  Colorectal CA screen results documented and reviewed (PV)  (In-House)              Z12.31  Encounter for screening mammogram for malignant neoplasm of breast

## 2021-05-18 NOTE — PROGRESS NOTES
Rivka Casillas 1955     Office/Outpatient Visit    Visit Date: Fri, Mar 30, 2018 08:27 am    Provider: Jono Lowe MD (Assistant: Hailee Campos MA)    Location: [facility]        Electronically signed by Jono Lowe MD on  03/30/2018 12:48:02 PM                             SUBJECTIVE:        CC:     Mrs. Casillas is a 62 year old White female.  left leg pain/congestion;         HPI: MDS    WWE  DEX    SPELL  NL  NL    SCOPE  MAT    LABS LATER    SINUS    L LE    TWO WEEKSG   X RAY   MOB         Patient presents with hTN.  Her current cardiac medication regimen includes a diuretic and an ACE inhibitor.  She has not kept a blood pressure diary, but states that pressures have been well controlled.  Compliance with treatment has been good.          Concerning acute bronchitis, these have been present for the past 2 weeks.  The symptoms include chest congestion, cough and sputum production.      ROS:     CONSTITUTIONAL:  Negative for chills and fever.      EYES:  Negative for eye drainage.      E/N/T:  Negative for ear pain, nasal congestion and sore throat.      CARDIOVASCULAR:  Negative for chest pain, palpitations and pedal edema.      RESPIRATORY:  Negative for dyspnea.      GASTROINTESTINAL:  Negative for abdominal pain, nausea and vomiting.      MUSCULOSKELETAL:  Positive for arthralgias (BACK OF LEFT KNEE RECENTLY).   Negative for myalgias.      NEUROLOGICAL:  Positive for dizziness ( STILL OCC LIGHTHEADEDNESS, NEG HOLTER AND EEG. ).   Negative for headaches.          PMH/FMH/SH:     Last Reviewed on 3/30/2018 08:44 AM by Jono Lowe    Past Medical History:             CURRENT MEDICAL PROVIDERS:    Obstetrician/Gynecologist         PREVENTIVE HEALTH MAINTENANCE             COLORECTAL CANCER SCREENING: Up to date (colonoscopy q10y; sigmoidoscopy q5y; Cologuard q3y) was last done 2011, Results are in chart; colonoscopy with normal results; The next colonoscopy is due   2017-PT STATES SHE WILL SCHEDULE;;         Surgical History:     Procedures: colonoscopy 2011--POLYPS AND DIVERTICULOSIS;;         Tobacco/Alcohol/Supplements:     Last Reviewed on 3/30/2018 08:43 AM by Jono Lowe    Tobacco: She has never smoked.              Current Problems:     Last Reviewed on 12/18/2017 08:48 AM by Jono Lowe    Near-syncope     Allergies     Osteoarthritis of ankle     Psoriasis     Vitamin D deficiency     High triglycerides     Diverticulosis     History of colonic polyps (due for repeat scope in 2017)     HTN     History of paroxysmal atrial tachycardia         Immunizations:     Adacel (Tdap) 7/10/2014         Allergies:     Last Reviewed on 3/30/2018 08:43 AM by Joon Lowe      No Known Drug Allergies.         Current Medications:     Last Reviewed on 3/30/2018 08:45 AM by Jono Lowe    Fenofibrate 145mg Tablet One PO QD with a meal     Lisinopril/Hydrochlorothiazide 20mg/25mg Tablet one a day     Calcium Carbonate/Vitamin D  600mg/800IU Tablet Take 1 tablet(s) by mouth daily     Vitamin D3 2,000IU Capsules 1 capsule daily         OBJECTIVE:        Vitals:         Current: 3/30/2018 8:31:36 AM    Ht:  5 ft, 3 in;  Wt: 212.6 lbs;  BMI: 37.7    T: 98.8 F (oral);  BP: 131/77 mm Hg (left arm, sitting);  P: 82 bpm (left arm (BP Cuff), sitting);  sCr: 0.9 mg/dL;  GFR: 73.88        Exams:     PHYSICAL EXAM:     GENERAL: vital signs recorded - well developed, well nourished;  no apparent distress;     EYES: conjunctiva and cornea are normal;     E/N/T:  normal EACs, TMs, nasal/oral mucosa, teeth, gingiva, and oropharynx;     NECK: trachea is midline; thyroid is non-palpable;     RESPIRATORY: normal respiratory rate and pattern with no distress; normal breath sounds with no rales, rhonchi, wheezes or rubs;     CARDIOVASCULAR: normal rate; rhythm is regular;  no edema;     GASTROINTESTINAL: nontender;     LYMPHATIC: no enlargement of cervical or  facial nodes;     MUSCULOSKELETAL: NEG LEFT KNEE EXAM;     NEUROLOGIC: GROSSLY INTACT;     PSYCHIATRIC:  appropriate affect and demeanor; normal speech pattern; grossly normal memory;         ASSESSMENT           401.1   I10  HTN              DDx:     466.0   J20.9  Acute bronchitis              DDx:     780.2   R55  Near-syncope (Improving)              DDx:     719.46   M25.562  Left Knee pain              DDx:     V12.72   Z86.010  History of colonic polyps              DDx:         ORDERS:         Meds Prescribed:       Amoxicillin 500mg Capsules 1 CAP TID  #30 (Thirty) capsule(s) Refills: 0       Bromfed-DM (Brompheniramine/Dextromethorphan/Pseudoephedrine) Syrup 2 TSP TID PRN COUGH  #8 (Eight) oz Refills: 0       Meloxicam 15mg Tablet Take 1 tablet(s) by mouth daily  #30 (Thirty) tablet(s) Refills: 1         Radiology/Test Orders:       [id number]  Left  radiologic examination, knee; three views  (Send-Out)           Procedures Ordered:       REFER  Referral to Specialist or Other Facility  (Send-Out)                   PLAN:          HTN         MEDICATIONS: (no change to current medication regimen)     FOLLOW-UP: Schedule a follow-up visit in 4 months. LABS IN THE SUMMER          Acute bronchitis           Prescriptions:       Amoxicillin 500mg Capsules 1 CAP TID  #30 (Thirty) capsule(s) Refills: 0       Bromfed-DM (Brompheniramine/Dextromethorphan/Pseudoephedrine) Syrup 2 TSP TID PRN COUGH  #8 (Eight) oz Refills: 0          Near-syncope     Consider further workup Observe as improved         Left Knee pain         MEDICATIONS: I have prescribed an NSAID.      RECOMMENDATIONS given include: ice therapy.      CONSIDER P.T. EVAL           Prescriptions:       Meloxicam 15mg Tablet Take 1 tablet(s) by mouth daily  #30 (Thirty) tablet(s) Refills: 1           Orders:       [id number]  Left  radiologic examination, knee; three views  (Send-Out)            History of colonic polyps         REFERRALS:  Referral initiated  to a general surgeon ( Dr. Tutu Ramsay; [facility]; a colonoscopy ).            Orders:       REFER  Referral to Specialist or Other Facility  (Send-Out)               Patient Recommendations:        For  HTN:     Schedule a follow-up visit in 4 months.          For Left Knee pain:     Use ice over the affected area.              CHARGE CAPTURE           **Please note: ICD descriptions below are intended for billing purposes only and may not represent clinical diagnoses**        Primary Diagnosis:         401.1 HTN            I10    Essential (primary) hypertension              Orders:          [id number]   Office/outpatient visit; established patient, level 4  (In-House)           466.0 Acute bronchitis            J20.9    Acute bronchitis, unspecified    780.2 Near-syncope            R55    Syncope and collapse    719.46 Left Knee pain            M25.562    Pain in left knee    V12.72 History of colonic polyps            Z86.010    Personal history of colonic polyps        ADDENDUMS:      ____________________________________    Addendum: 04/02/2018 10:32 AM -          Visit Note Faxed to:        Tutu Ramsay  (General Surgery); Number [phone]     Health Summary Faxed to:        Tutu Ramsay  (General Surgery); Number [phone]

## 2021-05-18 NOTE — PROGRESS NOTES
Rivka Casillas  1955     Office/Outpatient Visit    Visit Date: Thu, Jul 16, 2020 09:04 am    Provider: Jono Lowe MD (Assistant: Berta aWn RN)    Location: [facility]        Electronically signed by Jono Lowe MD on  07/16/2020 10:14:40 AM                             Subjective:        CC: Mrs. Casillas is a 64 year old White female.  This is a follow-up visit.  6 month follow-up visit;         HPI:           Patient to be evaluated for essential (primary) hypertension.  Her current cardiac medication regimen includes a diuretic and an ACE inhibitor.  Compliance with treatment has been good.  She has not kept a blood pressure diary, but states that pressures have been well controlled.            Pure hyperglyceridemia details; current treatment includes a lipid lowering agent.  Compliance with treatment has been good.  Most recent lab tests include Total Cholesterol:  136 (mg/dL) (02/29/2020), HDL:  55 (mg/dL) (02/29/2020), Triglycerides:  87 (mg/dL) (02/29/2020), LDL:  64 (mg/dL) (02/29/2020).            With regard to the type 2 diabetes mellitus, mild, specifically, this is type 2, non-insulin requiring diabetes.  Compliance with treatment has been fair.  Current meds include NONE.  Most recent lab results include Hemoglobin A1c:  6.8 (%) (10/10/2019), Microalbumin, Urine, rand:  <12.0 (mg/L) (02/29/2020).  HAS HAD DIABETIC EDUCATION     ROS:     CONSTITUTIONAL:  Negative for chills and fever.      E/N/T:  Negative for ear pain, nasal congestion and sore throat.      CARDIOVASCULAR:  Negative for chest pain, palpitations and pedal edema.      RESPIRATORY:  Negative for recent cough and dyspnea.      GASTROINTESTINAL:  Negative for abdominal pain, nausea and vomiting.      NEUROLOGICAL:  Negative for headaches.          Past Medical History / Family History / Social History:         Last Reviewed on 7/16/2020 09:16 AM by Jono Lowe    Past Medical  History:             PREVENTIVE HEALTH MAINTENANCE             BONE DENSITY: was last done 2019 with normal results     COLORECTAL CANCER SCREENING: Up to date (colonoscopy q10y; sigmoidoscopy q5y; Cologuard q3y) was last done 2018, Results are in chart; colonoscopy with normal results     DENTAL CLEANING: was last done      EYE EXAM: was last done 2019     MAMMOGRAM: Done within last 2 years and results in are chart was last done 2018 with normal results     PAP SMEAR: was last done 2018 with normal results             PAST MEDICAL HISTORY             GYNECOLOGICAL HISTORY:             CURRENT MEDICAL PROVIDERS:    Neurologist    Orthopedist         Surgical History:     Surgeries:    Tonsillectomy/Adenoidectomy Other Surgeries     section: X 1;     Dilation and Curettage    Bilateral Tubal Ligation         Family History:         Positive for Breast Cancer.      Positive for Type 2 Diabetes.          Social History:         Marital Status:          Tobacco/Alcohol/Supplements:     Last Reviewed on 2020 09:16 AM by Jono Lowe    Tobacco: She has never smoked.  Non-drinker     Caffeine:  She admits to consuming caffeine via -LITTLE.          Substance Abuse History:     Last Reviewed on 2020 09:16 AM by Jono Lowe    None         Current Problems:     Last Reviewed on 2020 09:16 AM by Jono Lowe    Essential (primary) hypertension    Personal history of colonic polyps    Diverticulosis    Pure hyperglyceridemia    Vitamin D deficiency, unspecified    Psoriasis    Allergies    Gastro-esophageal reflux disease without esophagitis    Hereditary and idiopathic neuropathy, unspecified    Type 2 diabetes mellitus, mild        Immunizations:     Hep A, adult dose 2018    Hep A, adult dose 2019    Adacel (Tdap) 7/10/2014        Allergies:     Last Reviewed on 2020 09:16 AM by Jono Lowe    No Known Allergies.         Current Medications:     Last Reviewed on 7/16/2020 09:16 AM by Jono Lowe    lisinopriL-hydrochlorothiazide 20-25 mg oral tablet [one a day]    fenofibrate nanocrystallized 145 mg oral tablet [One PO QD with a meal]    Calcium Carbonate/Vitamin D  600mg/800IU Tablet [Take 1 tablet(s) by mouth daily]    Vitamin D3 50 mcg (2,000 unit) oral capsule [1 capsule daily]    Meloxicam 15 mg oral tablet [1 tab daily]    Omeprazole 40 mg oral capsule,delayed release (enteric coated) [1 capsule daily PRN]    Vitamin C 250 mg oral tablet,chewable [2 gummies daily]    aspirin 81 mg oral tablet,chewable [chew 1 tablet (81 mg) by oral route once daily]        Objective:        Vitals:         Current: 7/16/2020 9:05:52 AM    Ht:  5 ft, 3 in;  Wt: 209.2 lbs;  BMI: 37.1BP: 146/53 mm Hg (right arm, sitting);  P: 78 bpm (right arm (BP Cuff), sitting);  sCr: 0.83 mg/dL;  GFR: 77.59        Repeat:     9:9:35 AM  BP:   145/60mm Hg (left arm, sitting)     Exams:     PHYSICAL EXAM:     GENERAL: vital signs recorded - well developed, well nourished;  no apparent distress;     NECK: trachea is midline; thyroid is non-palpable;     RESPIRATORY: normal respiratory rate and pattern with no distress; normal breath sounds with no rales, rhonchi, wheezes or rubs;     CARDIOVASCULAR: normal rate; rhythm is regular;  no edema;     LYMPHATIC: no enlargement of cervical or facial nodes; no supraclavicular nodes;     NEUROLOGIC: GROSSLY INTACT;     PSYCHIATRIC:  appropriate affect and demeanor; normal speech pattern; grossly normal memory;         Assessment:         E11.9   Type 2 diabetes mellitus, mild       I10   Essential (primary) hypertension       E78.1   Pure hyperglyceridemia           ORDERS:         Meds Prescribed:       [Refilled] omeprazole 40 mg oral capsule,delayed release (enteric coated) [1 capsule daily PRN], #90 (ninety) capsules, Refills: 1 (one)       [Refilled] lisinopriL-hydrochlorothiazide 20-25 mg oral tablet  [one a day], #90 (ninety) tablets, Refills: 1 (one)       [Refilled] fenofibrate nanocrystallized 145 mg oral tablet [One PO QD with a meal], #90 (ninety) tablets, Refills: 1 (one)         Radiology/Test Orders:       3017F  Colorectal CA screen results documented and reviewed (PV)  (In-House)              Lab Orders:       [id number]  DIAB - [facility] LIPID,CMP, A1C: 70547, 19144, 74881  (Send-Out)            APPTO  Appointment need  (In-House)                      Plan:         Type 2 diabetes mellitus, mild    LABORATORY:  Labs ordered to be performed today include Diabetes Panel 1; CMP, Lipid, A1C.  MIPS Vaccines Flu and Pneumonia updated in Shot record Colorectal Cancer Screening is up to date and the results are in the chart     FOLLOW-UP: Schedule a follow-up visit in 6 months.:.:for Medicare Wellness Visit           Orders:       22048  DIAB - [facility] LIPID,CMP, A1C: 25515, 02803, 45058  (Send-Out)            APPTO  Appointment need  (In-House)            3017F  Colorectal CA screen results documented and reviewed (PV)  (In-House)              Essential (primary) hypertension          Prescriptions:       [Refilled] lisinopriL-hydrochlorothiazide 20-25 mg oral tablet [one a day], #90 (ninety) tablets, Refills: 1 (one)         Pure hyperglyceridemia          Prescriptions:       [Refilled] fenofibrate nanocrystallized 145 mg oral tablet [One PO QD with a meal], #90 (ninety) tablets, Refills: 1 (one)             Other Prescriptions:       [Refilled] omeprazole 40 mg oral capsule,delayed release (enteric coated) [1 capsule daily PRN], #90 (ninety) capsules, Refills: 1 (one)         Patient Recommendations:        For  Type 2 diabetes mellitus, mild:    Schedule a follow-up visit in 6 months.                APPOINTMENT INFORMATION:        Monday Tuesday Wednesday Thursday Friday Saturday Sunday            Time:___________________AM  PM   Date:_____________________             Charge Capture:         Primary Diagnosis:      E11.9  Type 2 diabetes mellitus, mild           Orders:      04812  Office/outpatient visit; established patient, level 4  (In-House)            APPTO  Appointment need  (In-House)            3017F  Colorectal CA screen results documented and reviewed (PV)  (In-House)              I10  Essential (primary) hypertension     E78.1  Pure hyperglyceridemia

## 2021-05-18 NOTE — PROGRESS NOTES
Rivka Casillas  1955     Office/Outpatient Visit    Visit Date: Fri, Feb 12, 2021 08:25 am    Provider: Jono Lowe MD (Assistant: Tabitha Willingham MA)    Location: [facility]        Electronically signed by Jono Lowe MD on  02/12/2021 12:41:21 PM                             Subjective:        CC: Mrs. Casillas is a 65 year old White female.  Pt is here for MCW;         HPI:           Mrs. Casillas is here for a Medicare wellness visit.  The required HRA questions are integrated within this visit note. Family medical history and individual medical/surgical history were reviewed and updated.  A current height, weight, BMI, blood pressure, and pulse were recorded in the vitals section of the note and have been reviewed. Patient's medications, including supplements, were recorded in the chart and reviewed.  Current providers and suppliers were reviewed and updated.          Self-Assessment of Health: She rates her health as good. She rates her confidence of being able to control/manage most of her health problems as somewhat confident. Her physical/emotional health has limited her social activites not at all.  A review of possible cognitive impairment was performed and the following was noted: Memory Impairment Screen is normal.  A review of functional ability, including bathing, dressing, walking, and urine/bowel continence as well as level of safety was performed and was found to be negative.  Falls Risk: Has not had any falls or only one fall without injury in the past year.  She denies having trouble hearing the TV/radio when others do not, having to strain to hear or understand conversations and wearing hearing aid(s).  Concerning home safety, she reports that at home she DOES have adequate lighting, a skid resistant shower/tub, handrails on stairs, functioning smoke alarms and absence of throw rugs, but not grab bars in the bath.          Immunization Status: ** Has not  "received pneumococcal vaccination; ** Has not received influenza vaccine for this season; ** Has not received Prevnar 13 vaccination; Age>60, no shingles vaccination; Physical Activity: She exercises but less than 20 minutes 3 days per week; Type of diet patient normally eats is described as diabetic diet Tobacco: She has never smoked.  Preventative Health updated today           PHQ-9 Depression Screening: Completed form scanned and in chart; Total Score 1     ROS:     CONSTITUTIONAL:  Negative for chills and fever.      EYES:  Negative for blurred vision and eye drainage.      E/N/T:  Negative for ear pain, diminished hearing, nasal congestion and sore throat.      CARDIOVASCULAR:  Positive for palpitations ( OCCASIONAL BRIEF \"FLUTTER\", NEG HOLTER AND ECHO IN 2017 ).   Negative for chest pain or pedal edema.      RESPIRATORY:  Negative for recent cough and dyspnea.      GASTROINTESTINAL:  Negative for abdominal pain, anorexia, constipation, diarrhea, nausea and vomiting.      GENITOURINARY:  Negative for dysuria and hematuria.      MUSCULOSKELETAL:  Negative for myalgias.      NEUROLOGICAL:  Negative for headaches, paresthesias and vertigo.          Past Medical History / Family History / Social History:         Last Reviewed on 2021 08:39 AM by Jono Lowe    Past Medical History:             PREVENTIVE HEALTH MAINTENANCE             BONE DENSITY: was last done 2019 with normal results     COLORECTAL CANCER SCREENING: Up to date (colonoscopy q10y; sigmoidoscopy q5y; Cologuard q3y) was last done 2018, Results are in chart; colonoscopy with normal results     DENTAL CLEANING: was last done      EYE EXAM: was last done 2019     MAMMOGRAM: Done within last 2 years and results in are chart was last done 20 with normal results     PAP SMEAR: was last done 2018 with normal results             PAST MEDICAL HISTORY             GYNECOLOGICAL HISTORY:             CURRENT MEDICAL " PROVIDERS:    Neurologist    Orthopedist         Surgical History:     Surgeries:    Tonsillectomy/Adenoidectomy Other Surgeries     section: X 1;     Dilation and Curettage    Bilateral Tubal Ligation         Family History:         Positive for Breast Cancer.      Positive for Type 2 Diabetes.          Social History:         Marital Status:          Tobacco/Alcohol/Supplements:     Last Reviewed on 2021 08:39 AM by Jono Lowe    Tobacco: She has never smoked.  Non-drinker     Caffeine:  She admits to consuming caffeine via -LITTLE.          Substance Abuse History:     Last Reviewed on 2021 08:39 AM by Jono Lowe    None         Current Problems:     Last Reviewed on 2021 08:38 AM by Jono Lowe    Essential (primary) hypertension    Personal history of colonic polyps    Diverticulosis    Pure hyperglyceridemia    Vitamin D deficiency, unspecified    Psoriasis    Allergies    Gastro-esophageal reflux disease without esophagitis    Hereditary and idiopathic neuropathy, unspecified    Type 2 diabetes mellitus, diet-controlled    Encounter for general adult medical examination without abnormal findings    Encounter for screening for depression        Immunizations:     Hep A, adult dose 2018    Hep A, adult dose 2019    Adacel (Tdap) 7/10/2014        Allergies:     Last Reviewed on 2021 08:38 AM by Jono Lowe    No Known Allergies.        Current Medications:     Last Reviewed on 2020 09:16 AM by Jono Lowe    aspirin 81 mg oral tablet,chewable [chew 1 tablet (81 mg) by oral route once daily]    lisinopriL-hydrochlorothiazide 20-25 mg oral tablet [one a day]    fenofibrate nanocrystallized 145 mg oral tablet [One PO QD with a meal]    Calcium Carbonate/Vitamin D  600mg/800IU Tablet [Take 1 tablet(s) by mouth daily]    Vitamin D3 50 mcg (2,000 unit) oral capsule [1 capsule daily]    Meloxicam 15 mg oral  tablet [1 tab daily]    omeprazole 40 mg oral capsule,delayed release (enteric coated) [1 capsule daily PRN]    Vitamin C 250 mg oral tablet,chewable [2 gummies daily]        Objective:        Vitals:         Current: 2/12/2021 8:36:26 AM    Ht:  5 ft, 3 in;  Wt: 208.2 lbs;  BMI: 36.9T: 98.2 F (temporal);  BP: 142/65 mm Hg (left arm, sitting);  P: 80 bpm (right arm (BP Cuff), sitting);  sCr: 0.83 mg/dL;  GFR: 76.44        Exams:     PHYSICAL EXAM:     GENERAL: vital signs recorded - well developed, well nourished;  no apparent distress;     EYES: conjunctiva and cornea are normal;     E/N/T:  normal EACs, TMs, nasal/oral mucosa, teeth, gingiva, and oropharynx;     NECK: trachea is midline; thyroid is non-palpable;     RESPIRATORY: normal respiratory rate and pattern with no distress; normal breath sounds with no rales, rhonchi, wheezes or rubs;     CARDIOVASCULAR: normal rate; rhythm is regular;  no edema;     GASTROINTESTINAL: nontender, nondistended; no hepatosplenomegaly or masses; no bruits;     LYMPHATIC: no enlargement of cervical or facial nodes; no supraclavicular nodes;     MUSCULOSKELETAL: normal gait; normal overall tone NEG RIGHT KNEE EXAM;     NEUROLOGIC: GROSSLY INTACT;     PSYCHIATRIC:  appropriate affect and demeanor; normal speech pattern; grossly normal memory;         Procedures:     Encounter for immunization    1. Patient experienced no reaction.              Assessment:         Z00.00   Encounter for general adult medical examination without abnormal findings       E11.9   Type 2 diabetes mellitus, diet-controlled       I10   Essential (primary) hypertension       E55.9   Vitamin D deficiency, unspecified       Z11.59   Encounter for screening for other viral diseases       Z23   Encounter for immunization       Z13.31   Encounter for screening for depression           ORDERS:         Radiology/Test Orders:       3017F  Colorectal CA screen results documented and reviewed (PV)  (In-House)               Lab Orders:       04072  DIAB1 - [facility] LIPID,CMP, A1C: 34816, 40868, 51606  (Send-Out)            40500  BDCB2 - [facility] CBC w/o diff  (Send-Out)            29933  TSH - [facility] TSH  (Send-Out)            53944  VITD - [facility] Vitamin D, 25 Hydroxy  (Send-Out)              HPCMS - [facility] Hepatitis C AB (Medicare Screen)  (Send-Out)              Procedures Ordered:       05151  Fluzone High Dose  (In-House)              Other Orders:         Depression screen negative  (In-House)            1101F  Pt screen for fall risk; document no falls in past year or only 1 fall w/o injury in past year (KAILEY)  (In-House)              Screening mammogram results documented  (Send-Out)              Welcome to Medicare  (In-House)              Administration of influenza virus vaccine  (x1)                  Plan:         Encounter for general adult medical examination without abnormal findings    MIPS Negative Depression Screen ADVANCED DIRECTIVES: None         COUNSELING provided on: fall prevention, healthy eating habits, Medicare Preventative Services Guide given to patient., regular exercise, use of seat belts, and ADVISED TO SEE AN EYE DOCTOR AND DENTIST REGULARLY.      FOLLOW-UP: Schedule a follow-up visit in 6 months.            Orders:         Depression screen negative  (In-House)            1101F  Pt screen for fall risk; document no falls in past year or only 1 fall w/o injury in past year (KAILEY)  (In-House)              Screening mammogram results documented  (Send-Out)            3017F  Colorectal CA screen results documented and reviewed (PV)  (In-House)              Welcome to Medicare  (In-House)              Type 2 diabetes mellitus, diet-controlled    LABORATORY:  Labs ordered to be performed today include Diabetes Panel 1; CMP, Lipid, A1C.            Orders:       03850  DIAB1 - [facility] LIPID,CMP, A1C: 18362, 67032, 07805  (Send-Out)              Essential (primary) hypertension     LABORATORY:  Labs ordered to be performed today include CBC W/O DIFF and TSH.            Orders:       06842  BDCB2 - [facility] CBC w/o diff  (Send-Out)            69395  TSH - [facility] TSH  (Send-Out)              Vitamin D deficiency, unspecified    LABORATORY:  Labs ordered to be performed today include Vitamin D.            Orders:       34206  VITD - [facility] Vitamin D, 25 Hydroxy  (Send-Out)              Encounter for screening for other viral diseases    LABORATORY:  Labs ordered to be performed today include Hepatitis C Ab (Medicare Screen).            [facility]:         [facility]atitis C AB (Medicare Screen)  (Send-Out)              Encounter for immunization        IMMUNIZATIONS given today: Influenza HIGH Dose.            Immunizations:       61288  Fluzone High Dose  (In-House)                Dose (ml): 0.7  Site: left deltoid  Route: intramuscular  Administered by: Tabitha Willingham          : [employer]  Lot #: [id number]  Exp: 06/30/2021          NDC: [address]-14        Administration of influenza virus vaccine  (x1)              Patient Recommendations:        For  Encounter for general adult medical examination without abnormal findings:        Regularly exercise within recommended guidelines, especially to maintain balance. Remove obstacles in walkways at home.  Use non-skid material for bathtub safety.  Remove loose throw rugs from floor. Use nightlights in bedrooms, hallways, and bathrooms.    Limit dietary intake of fat (especially saturated fat) and cholesterol.  Eat a variety of foods, including plenty of fruits, vegetables, and grain containg fiber, limit fat intake to 30% of total calories. Balance caloric intake with energy expended.    Maintaining regular physical activity is advised to help prevent heart disease, hypertension, diabetes, and obesity.    Always use shoulder/lap restraints when driving or riding in a vehicle, even those equipped with air bags.  Schedule a  follow-up visit in 6 months.              Charge Capture:         Primary Diagnosis:     Z00.00  Encounter for general adult medical examination without abnormal findings           Orders:      24671  Preventive medicine, established patient, age 65+ years  (In-House)              Depression screen negative  (In-House)            1101F  Pt screen for fall risk; document no falls in past year or only 1 fall w/o injury in past year (KAILEY)  (In-House)            3017F  Colorectal CA screen results documented and reviewed (PV)  (In-House)              Welcome to Medicare  (In-House)              E11.9  Type 2 diabetes mellitus, diet-controlled     I10  Essential (primary) hypertension     E55.9  Vitamin D deficiency, unspecified     Z11.59  Encounter for screening for other viral diseases     Z23  Encounter for immunization           Orders:      68562  Fluzone High Dose  (In-House)              Administration of influenza virus vaccine  (x1)          Z13.31  Encounter for screening for depression

## 2021-05-18 NOTE — PROGRESS NOTES
Rivka Casillas 1955     Office/Outpatient Visit    Visit Date: Fri, Dec 28, 2018 11:16 am    Provider: Alyssia Belle N.P. (Assistant: Patrica Brown MA)    Location: [facility]        Electronically signed by Alyssia Belle N.P. on  12/28/2018 12:23:36 PM                             SUBJECTIVE:        HPI:         Patient complains of well Woman Exam.  Her last physical exam was 4 years ago.  Her last menstrual period was 2008.  She is not currently using any form of contraception.  She performs breast self-exams monthly.    Her last Pap smear was in 2014 and was normal.   Her last mammogram was in 12/27/2018 and the results are unknown.   Her last DEXA was in 2014 and was normal.   She underwent colonoscopy in 2018 with normal results.   She's had vision screening done in 2018.   A hearing test was done in 2017.   Preventative Health updated today.  Tobacco: She has never smoked.              PHQ-9 Depression Screening: Completed form scanned and in chart; Total Score 1 Alcohol Consumption Screening: Completed form scanned and in chart; Total Score 0     ROS:     CONSTITUTIONAL:  Negative for chills, fatigue, fever, and weight change.      EYES:  Negative for blurred vision, eye pain, and photophobia.      E/N/T:  Negative for hearing problems, E/N/T pain, congestion, rhinorrhea, epistaxis, hoarseness, and dental problems.      CARDIOVASCULAR:  Negative for chest pain, palpitations, tachycardia, orthopnea, and edema.      RESPIRATORY:  Negative for cough, dyspnea, and hemoptysis.      GASTROINTESTINAL:  Negative for abdominal pain, heartburn, constipation, diarrhea, and stool changes.      GENITOURINARY:  Negative for genital lesions, hematuria, menstrual problems, polyuria, abnormal vaginal bleeding, and vaginal discharge.      MUSCULOSKELETAL:  Negative for arthralgias, back pain, and myalgias.      INTEGUMENTARY/BREAST:  Negative for atypical moles, dry skin, pruritis, rashes,  breast masses, and nipple discharge.      NEUROLOGICAL:  Negative for dizziness, headaches, paresthesias, and weakness.      HEMATOLOGIC/LYMPHATIC:  Negative for easy bruising, bleeding, and lymphadenopathy.      ENDOCRINE:  Negative for hair loss, heat/cold intolerance, polydipsia, and polyphagia.      ALLERGIC/IMMUNOLOGIC:  Negative for allergies, frequent illnesses, HIV exposure, and urticaria.      PSYCHIATRIC:  Negative for anxiety, depression, and sleep disturbances.          PMH/FMH/SH:     Last Reviewed on 2018 09:05 AM by Jono Lowe    Past Medical History:             CURRENT MEDICAL PROVIDERS:    Obstetrician/Gynecologist         PREVENTIVE HEALTH MAINTENANCE             COLORECTAL CANCER SCREENING: Up to date (colonoscopy q10y; sigmoidoscopy q5y; Cologuard q3y) was last done 2018, Results are in chart; colonoscopy with normal results         Surgical History:     Surgeries:    Tonsillectomy/Adenoidectomy Other Surgeries     section    Dilation and Curettage    Bilateral Tubal Ligation         Tobacco/Alcohol/Supplements:     Last Reviewed on 2018 09:22 AM by Jono Lowe    Tobacco: She has never smoked.  Non-drinker     Caffeine:  She admits to consuming caffeine via -LITTLE.          Substance Abuse History:     None             Current Problems:     Last Reviewed on 2018 09:06 AM by Jono Lowe    GERD     Allergies     Osteoarthritis of ankle     Psoriasis     Vitamin D deficiency     High triglycerides     Diverticulosis     History of colonic polyps     HTN     History of paroxysmal atrial tachycardia     Near-syncope         Immunizations:     Adacel (Tdap) 7/10/2014         Allergies:     Last Reviewed on 2018 11:19 AM by Patrica Brown      No Known Drug Allergies.         Current Medications:     Last Reviewed on 2018 11:19 AM by Patrica Brown    Lisinopril/Hydrochlorothiazide 20mg/25mg Tablet one a day      Fenofibrate 145mg Tablet One PO QD with a meal     Vitamin D3 2,000IU Capsules 1 capsule daily     Calcium Carbonate/Vitamin D  600mg/800IU Tablet Take 1 tablet(s) by mouth daily     Omeprazole 40mg Capsules, Extended Release 1 capsule daily     Vitamin C 250mg Chewable Tablet 2 gummies daily         OBJECTIVE:        Vitals:         Current: 12/28/2018 11:30:33 AM    Ht:  5 ft, 3 in;  Wt: 223.4 lbs;  BMI: 39.6    T: 98.4 F (oral);  BP: 143/68 mm Hg (left arm, sitting);  P: 81 bpm (left arm (BP Cuff), sitting);  sCr: 0.83 mg/dL;  GFR: 80.80        Exams:     PHYSICAL EXAM:     GENERAL: vital signs recorded - well developed, well nourished;  no apparent distress;     EYES: extraocular movements intact; conjunctiva and cornea are normal; PERRLA;     E/N/T:  normal EACs, TMs, nasal/oral mucosa, teeth, gingiva, and oropharynx;     NECK: range of motion is normal; thyroid is non-palpable;     RESPIRATORY: normal respiratory rate and pattern with no distress; normal breath sounds with no rales, rhonchi, wheezes or rubs;     CARDIOVASCULAR: normal rate; rhythm is regular;  no systolic murmur; no edema;     GASTROINTESTINAL: nontender; normal bowel sounds; no organomegaly;     GENITOURINARY: Pap smear taken;  external genitalia: normal without lesions or urethral abnormalities;;  vagina: normal with good pelvic support and no lesions or discharge;;  cervix: normal appearance without lesions or discharge;;  adnexa: normal with no masses or tenderness     LYMPHATIC: no enlargement of cervical or facial nodes; no supraclavicular nodes;     BREAST/INTEGUMENT: BREASTS: breast exam is normal without masses, skin changes, or nipple discharge; SKIN: no significant rashes or lesions; no suspicious moles; breast exam: no overlying skin changes; no breast masses;     MUSCULOSKELETAL:  Normal range of motion, strength and tone;     NEUROLOGICAL:  cranial nerves, motor and sensory function, reflexes, gait and coordination are all  intact;     PSYCHIATRIC:  appropriate affect and demeanor; normal speech pattern; grossly normal memory;         Procedures:     Vaccination against hepatitis A     1. Hepatitis A (adult): 1.0 ml given IM in the left upper arm; administered by and;  lot number [id number]; expires 11/18/19             ASSESSMENT:           V72.31     Well Woman Exam     V79.0   Z13.89  Screening for depression              DDx:     V82.81   Z13.820  Screening for osteoporosis              DDx:     V05.3   Z23  Vaccination against hepatitis A              DDx:         ORDERS:         Radiology/Test Orders:       42444  DXA, bone density study, 1 or more sites; axial skeleton (eg hips, pelvis, spine)  (Send-Out)           Lab Orders:       31137  Cytopathology, slides, cervical or vaginal; manual screening under MD supervision  (Send-Out)           Procedures Ordered:       26092  Immunization administration; one vaccine  (In-House)         43466  HepA vaccine adult dose for intramuscular use  (In-House)           Other Orders:       1101F  Pt screen for fall risk; document no falls in past year or only 1 fall w/o injury in past year (KAILEY)  (In-House)           Negative EtOH screen  (In-House)           Calculated BMI above the upper parameter and a follow-up plan was documented in the medical record  (In-House)                   PLAN:          Well Woman Exam     MIPS PHQ-9 Depression Screening Completed form scanned and in chart; Total Score 1 Negative alcohol screen     BMI Elevated - Follow-Up Plan: She was provided education on weight loss strategies           Orders:      48182  Cytopathology, slides, cervical or vaginal; manual screening under MD supervision  (Send-Out)         1101F  Pt screen for fall risk; document no falls in past year or only 1 fall w/o injury in past year (KAILEY)  (In-House)           Negative EtOH screen  (In-House)           Calculated BMI above the upper parameter and a follow-up plan was  documented in the medical record  (In-House)             Patient Education Handouts:       Physical Exam 60+ year, Female           Screening for osteoporosis         FOLLOW-UP TESTING #1:    RADIOLOGY:  I have ordered Dexa Scan to be done today.            Orders:       18034  DXA, bone density study, 1 or more sites; axial skeleton (eg hips, pelvis, spine)  (Send-Out)            Vaccination against hepatitis A           Orders:       77437  Immunization administration; one vaccine  (In-House)         16937  HepA vaccine adult dose for intramuscular use  (In-House)               CHARGE CAPTURE:           Primary Diagnosis:     V72.31    Well Woman Exam                Z01.419    Encounter for gynecological examination (general) (routine) without abnormal findings                       Orders:          88169   Preventive medicine, established patient, age 40-64 years  (In-House)             1101F   Pt screen for fall risk; document no falls in past year or only 1 fall w/o injury in past year (KAILEY)  (In-House)                Negative EtOH screen  (In-House)                Calculated BMI above the upper parameter and a follow-up plan was documented in the medical record  (In-House)           V79.0 Screening for depression            Z13.89    Encounter for screening for other disorder    V82.81 Screening for osteoporosis            Z13.820    Encounter for screening for osteoporosis    V05.3 Vaccination against hepatitis A            Z23    Encounter for immunization              Orders:          32488   Immunization administration; one vaccine  (In-House)             78312   HepA vaccine adult dose for intramuscular use  (In-House)               ADDENDUMS:      ____________________________________    Addendum: 12/28/2018 12:40 PM - Iman Mayes        Please add [phone] handling fee.

## 2021-05-18 NOTE — PROGRESS NOTES
Rivka Casillas  1955     Office/Outpatient Visit    Visit Date: Thu, Jan 16, 2020 10:05 am    Provider: Jono Lowe MD (Assistant: Diamond Sherwood MA)    Location: [facility]        Electronically signed by Jono Lowe MD on  01/16/2020 11:45:53 AM                             Subjective:        CC: Mrs. Casillas is a 64 year old White female.  This is a follow-up visit.  on diabetes;         HPI:           Mrs. Casillas presents with nIDDM (mild).  Specifically, this is type 2, non-insulin requiring diabetes.  Compliance with treatment has been fair.  Current meds include NONE.  Most recent lab results include Hemoglobin A1c:  6.8 (%) (10/10/2019).  HAS HAD DIABETIC EDUCATION           In regard to the essential (primary) hypertension, her current cardiac medication regimen includes a diuretic and an ACE inhibitor.  Compliance with treatment has been good.  She has not kept a blood pressure diary, but states that pressures have been well controlled.            Additionally, she presents with history of pure hyperglyceridemia.  current treatment includes a lipid lowering agent.  Compliance with treatment has been good.  Most recent lab tests include LDL:  84 (mg/dL) (06/25/2018), HDL:  53 (mg/dL) (06/25/2018), Triglycerides:  108 (mg/dL) (06/25/2018), Total Cholesterol:  166 (mg/dL) (09/06/2019).      ROS:     CONSTITUTIONAL:  Negative for chills and fever.      E/N/T:  Negative for ear pain, nasal congestion and sore throat.      CARDIOVASCULAR:  Negative for chest pain, palpitations and pedal edema.      RESPIRATORY:  Negative for recent cough and dyspnea.      GASTROINTESTINAL:  Negative for abdominal pain, nausea and vomiting.      NEUROLOGICAL:  Positive for dizziness ( IMPROVED.  NEURO W/U IN PROGRESS ), headaches ( tension; OCCASIONAL ) and memory loss (ALSO IMPROVED).          Past Medical History / Family History / Social History:         Last Reviewed on 1/16/2020  10:20 AM by Jono Lowe    Past Medical History:             PREVENTIVE HEALTH MAINTENANCE             BONE DENSITY: was last done 2019 with normal results     COLORECTAL CANCER SCREENING: Up to date (colonoscopy q10y; sigmoidoscopy q5y; Cologuard q3y) was last done 2018, Results are in chart; colonoscopy with normal results     DENTAL CLEANING: was last done      EYE EXAM: was last done      MAMMOGRAM: Done within last 2 years and results in are chart was last done 2018 with normal results     PAP SMEAR: was last done 2018 with normal results             PAST MEDICAL HISTORY             GYNECOLOGICAL HISTORY:             CURRENT MEDICAL PROVIDERS:    Neurologist    Orthopedist         Surgical History:     Surgeries:    Tonsillectomy/Adenoidectomy Other Surgeries     section: X 1;     Dilation and Curettage    Bilateral Tubal Ligation         Family History:         Positive for Breast Cancer.      Positive for Type 2 Diabetes.          Social History:         Marital Status:          Tobacco/Alcohol/Supplements:     Last Reviewed on 2020 10:20 AM by Jono Lowe    Tobacco: She has never smoked.  Non-drinker     Caffeine:  She admits to consuming caffeine via -LITTLE.          Substance Abuse History:     Last Reviewed on 2020 10:20 AM by Jono Lowe    None         Current Problems:     Last Reviewed on 2020 10:20 AM by Jono Lowe    Essential (primary) hypertension    History of paroxysmal atrial tachycardia    HTN    Diverticulosis    Personal history of colonic polyps    History of colonic polyps    Pure hyperglyceridemia    High triglycerides    Vitamin D deficiency, unspecified    Vitamin D deficiency    Psoriasis    Allergies    Osteoarthritis of ankle    GERD    Gastro-esophageal reflux disease without esophagitis    Dizziness and giddiness    Hereditary and idiopathic neuropathy, unspecified    Dizziness  (mild, occasional)    Osteoarthritis of knee    NIDDM (mild)        Immunizations:     Hep A, adult dose 12/28/2018    Hep A, adult dose 7/13/2019    Adacel (Tdap) 7/10/2014        Allergies:     Last Reviewed on 1/16/2020 10:20 AM by Jono Lowe    No Known Allergies.        Current Medications:     Last Reviewed on 1/16/2020 10:20 AM by Jono Lowe    Lisinopril/Hydrochlorothiazide 20mg/25mg Tablet [one a day]    Fenofibrate 145mg Tablet [One PO QD with a meal]    Calcium Carbonate/Vitamin D  600mg/800IU Tablet [Take 1 tablet(s) by mouth daily]    Vitamin D3 2,000IU Capsules [1 capsule daily]    Meloxicam 15mg Tablet [1 tab daily]    Omeprazole 40mg Capsules, Extended Release [1 capsule daily PRN]    Vitamin C 250mg Chewable Tablet [2 gummies daily]        Objective:        Vitals:         Current: 1/16/2020 10:14:26 AM    Ht:  5 ft, 3 in;  Wt: 213.4 lbs;  BMI: 37.8T: 98.4 F (oral);  BP: 148/70 mm Hg (left arm, sitting);  P: 67 bpm (left arm (BP Cuff), sitting);  sCr: 0.89 mg/dL;  GFR: 72.97        Exams:     PHYSICAL EXAM:     GENERAL: vital signs recorded - well developed, well nourished;  no apparent distress;     NECK: trachea is midline; thyroid is non-palpable;     RESPIRATORY: normal respiratory rate and pattern with no distress; normal breath sounds with no rales, rhonchi, wheezes or rubs;     CARDIOVASCULAR: normal rate; rhythm is regular;  no edema;     LYMPHATIC: no enlargement of cervical or facial nodes; no supraclavicular nodes;     NEUROLOGIC: GROSSLY INTACT;     PSYCHIATRIC:  appropriate affect and demeanor; normal speech pattern; grossly normal memory;         Assessment:         250.00   NIDDM (mild)       E11.9   Type 2 diabetes mellitus without complications       I10   Essential (primary) hypertension       E78.1   Pure hyperglyceridemia       780.4   Dizziness (mild, occasional)   (Mild)     R42   Dizziness and giddiness           ORDERS:         Lab Orders:        APPTO  Appointment need  (In-House)            FUTURE  Future order to be done at patients convenience  (Send-Out)            [id number]  [address] CMP A1C LIPID AND MICRO ALBUM CR RATIO: [id number]  (Send-Out)                      Plan:         Type 2 diabetes mellitus without complications        FOLLOW-UP: Schedule a follow-up visit in 6 months.:.      FOLLOW-UP TESTING #1: FOLLOW-UP LABORATORY:  Labs to be scheduled in the future include Diabetes Panel 2;CMP, A1C, Lipid, Microalbumin:Creatinine Ratio.   Schedule a follow-up visit in 1 month.            Orders:       APPTO  Appointment need  (In-House)            FUTURE  Future order to be done at patients convenience  (Send-Out)            [id number]  [address] CMP A1C LIPID AND MICRO ALBUM CR RATIO: [id number]  (Send-Out)              Essential (primary) hypertension        MEDICATIONS: (no change to current medication regimen)         Pure hyperglyceridemia        MEDICATIONS: (no change to current medication regimen)         Dizziness and giddiness        PLANS PER NEURO             Patient Recommendations:        For  Type 2 diabetes mellitus without complications:    Schedule a follow-up visit in 6 months.                APPOINTMENT INFORMATION:        Monday Tuesday Wednesday Thursday Friday Saturday Sunday            Time:___________________AM  PM   Date:_____________________         The following laboratory testing has been ordered: Schedule the above testing in 1 month.          For  Dizziness and giddiness:    I also recommend PLANS PER NEURO.              Charge Capture:         Primary Diagnosis:     250.00  NIDDM (mild)           Orders:      06011  Office/outpatient visit; established patient, level 4  (In-House)              E11.9  Type 2 diabetes mellitus without complications           Orders:      APPTO  Appointment need  (In-House)              I10  Essential (primary) hypertension     E78.1  Pure  hyperglyceridemia     780.4  Dizziness (mild, occasional)     R42  Dizziness and giddiness

## 2021-05-18 NOTE — PROGRESS NOTES
Rivka Casillas 1955     Office/Outpatient Visit    Visit Date: Fri, Sep 6, 2019 08:22 am    Provider: Jono Lowe MD (Assistant: Diamond Sherwood MA)    Location: [facility]        Electronically signed by Jono Lowe MD on  09/06/2019 09:21:29 AM                             SUBJECTIVE:        CC:     Mrs. Casillas is a 64 year old White female.  This is a follow-up visit.  check up;         HPI: G 3   P 3    C/S 1    EYE 19    DMD  19    NEURO   MRA   LABS   RESULTS    KNEE L  ORTHO    ?ROUTINE LABS    DEPR  2    MOLE        PPI    ADV  FLU                Mrs. Casillas presents with annual exam.  She cannot recall when she last had a physical exam.    She underwent colonoscopy 1 year ago.   Preventative Health updated today.  Tobacco: She has never smoked.          PHQ-9 Depression Screening: Completed form scanned and in chart; Total Score 2     ROS:     CONSTITUTIONAL:  Negative for chills and fever.      EYES:  Negative for blurred vision and eye drainage.      E/N/T:  Negative for ear pain, diminished hearing, nasal congestion and sore throat.      CARDIOVASCULAR:  Negative for chest pain, palpitations and pedal edema.      RESPIRATORY:  Negative for recent cough and dyspnea.      GASTROINTESTINAL:  Negative for abdominal pain, anorexia, constipation, diarrhea, nausea and vomiting.      GENITOURINARY:  Negative for dysuria and hematuria.      MUSCULOSKELETAL:  Positive for arthralgias (RIGHT KNEE, OA PER ORTHO, IMPROVED WITH MEDS AND EXERCISE).   Negative for myalgias.      INTEGUMENTARY/BREAST:  Positive for suspicous mole ( irregular border, increasing size, irritation ).      NEUROLOGICAL:  Positive for dizziness ( STILL OCCASIONAL, BRIEF, NEG EEG AND CAROTID DOPPLER. NEURO W/U IN PROGRESS. ), headaches ( tension; OCCASIONAL ) and memory loss (MILD LATELY, NEURO W/Y IN PROGRESS).   Negative for fainting.          PMH/FMH/SH:     Last Reviewed on 9/06/2019 07:35 AM by Jono Lowe  [place]    Past Medical History:             PREVENTIVE HEALTH MAINTENANCE             BONE DENSITY: was last done 2019 with normal results     COLORECTAL CANCER SCREENING: Up to date (colonoscopy q10y; sigmoidoscopy q5y; Cologuard q3y) was last done 2018, Results are in chart; colonoscopy with normal results     MAMMOGRAM: Done within last 2 years and results in are chart was last done 2018 with normal results     PAP SMEAR: was last done 2018 with normal results         Surgical History:     Surgeries:    Tonsillectomy/Adenoidectomy Other Surgeries     section    Dilation and Curettage    Bilateral Tubal Ligation         Family History:         Positive for Breast Cancer.      Positive for Type 2 Diabetes.          Social History:         Marital Status:          Tobacco/Alcohol/Supplements:     Last Reviewed on 2019 08:32 AM by Jono Lowe    Tobacco: She has never smoked.  Non-drinker     Caffeine:  She admits to consuming caffeine via -LITTLE.          Substance Abuse History:     Last Reviewed on 2019 07:27 AM by Jono Lowe    None             Current Problems:     Last Reviewed on 2019 08:33 AM by Jono Lowe    GERD     Allergies     Osteoarthritis of ankle     Psoriasis     Vitamin D deficiency     High triglycerides     Diverticulosis     History of colonic polyps     HTN     History of paroxysmal atrial tachycardia     Knee pain     Near-syncope         Immunizations:     Hep A, adult dose 2018     Hep A, adult dose 2019     Adacel (Tdap) 7/10/2014         Allergies:     Last Reviewed on 2019 08:32 AM by Jono Lowe      No Known Drug Allergies.         Current Medications:     Last Reviewed on 2019 08:32 AM by Jono Lowe    Lisinopril/Hydrochlorothiazide 20mg/25mg Tablet one a day     Omeprazole 40mg Capsules, Extended Release 1 capsule daily     Fenofibrate 145mg Tablet One PO QD with  a meal     Vitamin D3 2,000IU Capsules 1 capsule daily     Calcium Carbonate/Vitamin D  600mg/800IU Tablet Take 1 tablet(s) by mouth daily     Vitamin C 250mg Chewable Tablet 2 gummies daily     Meloxicam 15mg Tablet 1 tab daily         OBJECTIVE:        Vitals:         Current: 9/6/2019 8:28:23 AM    Ht:  5 ft, 3 in;  Wt: 222.8 lbs;  BMI: 39.5    T: 98.5 F (oral);  BP: 147/63 mm Hg (left arm, sitting);  P: 72 bpm (left arm (BP Cuff), sitting);  sCr: 0.83 mg/dL;  GFR: 79.69        Exams:     PHYSICAL EXAM:     GENERAL: vital signs recorded - well developed, well nourished;  no apparent distress;     EYES: conjunctiva and cornea are normal;     E/N/T:  normal EACs, TMs, nasal/oral mucosa, teeth, gingiva, and oropharynx;     NECK: trachea is midline; thyroid is non-palpable;     RESPIRATORY: normal respiratory rate and pattern with no distress; normal breath sounds with no rales, rhonchi, wheezes or rubs;     CARDIOVASCULAR: normal rate; rhythm is regular;  no edema;     GASTROINTESTINAL: nontender, nondistended; no hepatosplenomegaly or masses; no bruits;     LYMPHATIC: no enlargement of cervical or facial nodes; no supraclavicular nodes;     BREAST/INTEGUMENT: atypical mole(s) mole is 7-8 mm in size, located on the face, irritated, scaling, irregularly bordered, and enlarging in size (by patient history);     MUSCULOSKELETAL: normal gait; normal overall tone NEG RIGHT KNEE EXAM;     NEUROLOGIC: GROSSLY INTACT;     PSYCHIATRIC:  appropriate affect and demeanor; normal speech pattern; grossly normal memory;         ASSESSMENT           V70.0   Z00.00   Z00.01  Annual exam              DDx:     268.9   E55.9  Vitamin D deficiency              DDx:     272.1   E78.1  High triglycerides              DDx:     401.1   I10  HTN              DDx:     780.4   R42  Dizziness (mild, occasional)              DDx:     238.2   L91.8  Atypical mole              DDx:     715.16   G60.9  Right Osteoarthritis of knee              DDx:      V79.0   Z13.89  Screening for depression              DDx:         ORDERS:         Meds Prescribed:       Refill of: Lisinopril/Hydrochlorothiazide 20mg/25mg Tablet one a day  #90 (Ninety) tablet(s) Refills: 1       Refill of: Fenofibrate 145mg Tablet One PO QD with a meal  #90 (Ninety) tablet(s) Refills: 1         Radiology/Test Orders:       3014F  Screening mammography results documented and reviewed (PV)1  (In-House)         3017F  Colorectal CA screen results documented and reviewed (PV)  (In-House)           Lab Orders:       81073  VITD - [facility] Vitamin D, 25 Hydroxy  (Send-Out)         50831  COMP - [facility] Comp. Metabolic Panel  (Send-Out)         68909  LPDP - [facility] Lipid Panel  (Send-Out)         32374  BDCBC - [facility] CBC with 3 part diff  (Send-Out)         39667  TSH - [facility] TSH  (Send-Out)           Procedures Ordered:       REFER  Referral to Specialist or Other Facility  (Send-Out)           Other Orders:         Depression screen negative  (In-House)                   PLAN:          Annual exam         COUNSELING provided on: healthy eating habits, regular exercise, use of seat belts, and ADVISED TO SEE AN EYE DOCTOR AND DENTIST REGULARLY.      FOLLOW-UP: Schedule a follow-up visit in 6 months.  MIPS Negative Depression Screen           Orders:         Depression screen negative  (In-House)         3014F  Screening mammography results documented and reviewed (PV)1  (In-House)         3017F  Colorectal CA screen results documented and reviewed (PV)  (In-House)            Vitamin D deficiency     LABORATORY:  Labs ordered to be performed today include Vitamin D.            Orders:       46894  VITD - [facility] Vitamin D, 25 Hydroxy  (Send-Out)            High triglycerides     LABORATORY:  Labs ordered to be performed today include Comprehensive metabolic panel and lipid panel.            Prescriptions:       Refill of: Fenofibrate 145mg Tablet One PO QD with a meal  #90 (Ninety) tablet(s) Refills: 1            Orders:       [id number]  COMP - [facility] Comp. Metabolic Panel  (Send-Out)         50319  LPDP - [facility] Lipid Panel  (Send-Out)            HTN     LABORATORY:  Labs ordered to be performed today include CBC and TSH.            Prescriptions:       Refill of: Lisinopril/Hydrochlorothiazide 20mg/25mg Tablet one a day  #90 (Ninety) tablet(s) Refills: 1           Orders:       02014  BDCBC - [facility] CBC with 3 part diff  (Send-Out)         25541  TSH - [facility] TSH  (Send-Out)            Dizziness (mild, occasional)         WILL GET NOTES FROM NEUROLOGY          Atypical mole         REFERRALS:  Referral initiated to a dermatologist ( Dr. Ann Quintana ).            Orders:       REFER  Referral to Specialist or Other Facility  (Send-Out)           Right Osteoarthritis of knee         PLANS PER ORTHO             Patient Recommendations:        For  Annual exam:         Limit dietary intake of fat (especially saturated fat) and cholesterol.  Eat a variety of foods, including plenty of fruits, vegetables, and grain containg fiber, limit fat intake to 30% of total calories. Balance caloric intake with energy expended.    Maintaining regular physical activity is advised to help prevent heart disease, hypertension, diabetes, and obesity.    Always use shoulder/lap restraints when driving or riding in a vehicle, even those equipped with air bags.  Schedule a follow-up visit in 6 months.          For  Dizziness (mild, occasional):     I also recommend WILL GET NOTES FROM NEUROLOGY.          For Right Osteoarthritis of knee:     I also recommend PLANS PER ORTHO.              CHARGE CAPTURE           **Please note: ICD descriptions below are intended for billing purposes only and may not represent clinical diagnoses**        Primary Diagnosis:         V70.0 Annual exam            Z00.00    Encounter for general adult medical examination without abnormal findings           Z00.01    Encounter for general adult medical examination with abnormal  findings              Orders:          27346   Preventive medicine, established patient, age 40-64 years  (In-House)                Depression screen negative  (In-House)             3014F   Screening mammography results documented and reviewed (PV)1  (In-House)             3017F   Colorectal CA screen results documented and reviewed (PV)  (In-House)           268.9 Vitamin D deficiency            E55.9    Vitamin D deficiency, unspecified    272.1 High triglycerides            E78.1    Pure hyperglyceridemia    401.1 HTN            I10    Essential (primary) hypertension    780.4 Dizziness (mild, occasional)            R42    Dizziness and giddiness    238.2 Atypical mole            L91.8    Other hypertrophic disorders of the skin    715.16 Right Osteoarthritis of knee            G60.9    Hereditary and idiopathic neuropathy, unspecified    V79.0 Screening for depression            Z13.89    Encounter for screening for other disorder

## 2021-05-18 NOTE — PROGRESS NOTES
Rivka Casillas 1955     Office/Outpatient Visit    Visit Date: Mon, Mar 4, 2019 08:51 am    Provider: Jono Lowe MD (Assistant: Shyla Campos LPN)    Location: [facility]        Electronically signed by Jono Lowe MD on  03/04/2019 10:06:13 AM                             SUBJECTIVE:        CC:     Mrs. Casillas is a 63 year old White female.  follow up;         HPI:         Patient presents with hTN.  Her current cardiac medication regimen includes a diuretic and an ACE inhibitor.  She has not kept a blood pressure diary, but states that pressures have been well controlled.  Compliance with treatment has been good.          Concerning gERD, the location of the discomfort is primarily epigastric.  She describes the pain as burning.  Symptoms are improved with Maalox or Mylanta and a proton-pump inhibitor.  ONLY USING PPI PRN AT THIS TIME.      ROS:     CONSTITUTIONAL:  Negative for chills and fever.      E/N/T:  Negative for ear pain, nasal congestion and sore throat.      CARDIOVASCULAR:  Negative for chest pain, palpitations and pedal edema.      RESPIRATORY:  Negative for dyspnea.      GASTROINTESTINAL:  Negative for abdominal pain, nausea and vomiting.      MUSCULOSKELETAL:  Negative for arthralgias and (RIGHT KNEE) myalgias.      NEUROLOGICAL:  Positive for dizziness ( STILL OCCASIONAL, BRIEF, NEG EEG AND CAROTID DOPPLER. ), headaches ( tension; OCCASIONAL ), memory loss and (MILD LATELY) MRI WITH SOME NONSPECIFIC FINDINGS.  WILL SEE NEUROLOGIST IN MAY..   Negative for fainting.          PMH/FMH/SH:     Last Reviewed on 3/04/2019 09:04 AM by Jono Lowe    Past Medical History:             CURRENT MEDICAL PROVIDERS:    Obstetrician/Gynecologist         PREVENTIVE HEALTH MAINTENANCE             COLORECTAL CANCER SCREENING: Up to date (colonoscopy q10y; sigmoidoscopy q5y; Cologuard q3y) was last done 2018, Results are in chart; colonoscopy with normal results          Surgical History:     Surgeries:    Tonsillectomy/Adenoidectomy Other Surgeries     section    Dilation and Curettage    Bilateral Tubal Ligation         Tobacco/Alcohol/Supplements:     Last Reviewed on 3/04/2019 09:03 AM by Jono Lowe    Tobacco: She has never smoked.  Non-drinker     Caffeine:  She admits to consuming caffeine via -LITTLE.          Substance Abuse History:     Last Reviewed on 3/04/2019 09:03 AM by Jono Lowe    None             Current Problems:     Last Reviewed on 3/04/2019 09:06 AM by Jono Lowe    GERD     Allergies     Osteoarthritis of ankle     Psoriasis     Vitamin D deficiency     High triglycerides     Diverticulosis     History of colonic polyps     HTN     History of paroxysmal atrial tachycardia     Near-syncope         Immunizations:     Hep A, adult dose 2018     Adacel (Tdap) 7/10/2014         Allergies:     Last Reviewed on 3/04/2019 09:03 AM by Jono Lowe      No Known Drug Allergies.         Current Medications:     Last Reviewed on 3/04/2019 09:05 AM by Jono Lowe    Lisinopril/Hydrochlorothiazide 20mg/25mg Tablet one a day     Fenofibrate 145mg Tablet One PO QD with a meal     Vitamin D3 2,000IU Capsules 1 capsule daily     Calcium Carbonate/Vitamin D  600mg/800IU Tablet Take 1 tablet(s) by mouth daily     Omeprazole 40mg Capsules, Extended Release 1 capsule daily     Vitamin C 250mg Chewable Tablet 2 gummies daily         OBJECTIVE:        Vitals:         Current: 3/4/2019 8:58:01 AM    Ht:  5 ft, 3 in;  Wt: 220.2 lbs;  BMI: 39.0    T: 98.1 F (oral);  BP: 146/71 mm Hg (left arm, sitting);  P: 74 bpm (left arm (BP Cuff), sitting);  sCr: 0.83 mg/dL;  GFR: 80.31        Exams:     PHYSICAL EXAM:     GENERAL: vital signs recorded - well developed, well nourished;  no apparent distress;     NECK: trachea is midline; thyroid is non-palpable;     RESPIRATORY: normal respiratory rate and pattern with no  distress; normal breath sounds with no rales, rhonchi, wheezes or rubs;     CARDIOVASCULAR: normal rate; rhythm is regular;  no edema;     LYMPHATIC: no enlargement of cervical or facial nodes;     MUSCULOSKELETAL: NEG RIGHT KNEE EXAM;     NEUROLOGIC: GROSSLY INTACT;     PSYCHIATRIC:  appropriate affect and demeanor; normal speech pattern; grossly normal memory;         ASSESSMENT           401.1   I10  HTN              DDx:     530.81   K21.9  GERD              DDx:     780.2   R55  Near-syncope              DDx:     719.46   M25.561  Right Knee pain              DDx:         PLAN:          HTN         MEDICATIONS: (no change to current medication regimen)     FOLLOW-UP: Schedule a follow-up visit in 6 months.           GERD         MEDICATIONS: (no change to current medication regimen) Over-the-counter medications recommended include antacids.           Near-syncope         RECOMMENDATIONS given include: SEE NEUROLOGIST AS PLANNED.          Right Knee pain     Observe for now Consider further workup OTC meds PRN             Patient Recommendations:        For  HTN:     Schedule a follow-up visit in 6 months.          For  GERD:     Use over-the-counter antacids (i.e. Tums) for pain relief.              CHARGE CAPTURE           **Please note: ICD descriptions below are intended for billing purposes only and may not represent clinical diagnoses**        Primary Diagnosis:         401.1 HTN            I10    Essential (primary) hypertension              Orders:          97600   Office/outpatient visit; established patient, level 4  (In-House)           530.81 GERD            K21.9    Gastro-esophageal reflux disease without esophagitis    780.2 Near-syncope            R55    Syncope and collapse    719.46 Right Knee pain            M25.561    Pain in right knee

## 2021-05-18 NOTE — PROGRESS NOTES
Rivka Casillas 1955     Office/Outpatient Visit    Visit Date: Mon, Jun 25, 2018 08:45 am    Provider: Jono Lowe MD (Assistant: Olga Yates MA)    Location: [facility]        Electronically signed by Jono Lowe MD on  06/25/2018 01:03:51 PM                             SUBJECTIVE:        CC:     Mrs. Casillas is a 62 year old White female.  This is a follow-up visit.          HPI:         Patient to be evaluated for high triglycerides.  Current treatment includes a lipid lowering agent.  Compliance with treatment has been good.  Most recent lab tests include Total Cholesterol:  158 (mg/dL) (06/29/2017), HDL:  63 (mg/dL) (06/29/2017), Triglycerides:  85 (mg/dL) (06/29/2017), LDL:  78 (mg/dL) (06/29/2017).          In regard to the hTN, her current cardiac medication regimen includes a diuretic and an ACE inhibitor.  She has not kept a blood pressure diary, but states that pressures have been well controlled.  Compliance with treatment has been good.      ROS:     CONSTITUTIONAL:  Negative for chills and fever.      EYES:  Negative for eye drainage.      E/N/T:  Negative for ear pain, nasal congestion and sore throat.      CARDIOVASCULAR:  Negative for chest pain, palpitations and pedal edema.      RESPIRATORY:  Negative for dyspnea.      GASTROINTESTINAL:  Negative for abdominal pain, nausea and vomiting.      MUSCULOSKELETAL:  Negative for arthralgias and myalgias.      NEUROLOGICAL:  Negative for dizziness and headaches.          PM/[facility]/SH:     Last Reviewed on 6/25/2018 09:13 AM by Jono Lowe    Past Medical History:             CURRENT MEDICAL PROVIDERS:    Obstetrician/Gynecologist         PREVENTIVE HEALTH MAINTENANCE             COLORECTAL CANCER SCREENING: Up to date (colonoscopy q10y; sigmoidoscopy q5y; Cologuard q3y) was last done 2018, Results are in chart; colonoscopy with normal results         Surgical History:     Procedures: colonoscopy 2011--POLYPS AND  DIVERTICULOSIS;;         Tobacco/Alcohol/Supplements:     Last Reviewed on 6/25/2018 09:11 AM by Jono Lowe    Tobacco: She has never smoked.              Current Problems:     Last Reviewed on 6/25/2018 09:22 AM by Jono Lowe    Near-syncope     Allergies     Osteoarthritis of ankle     Psoriasis     Vitamin D deficiency     High triglycerides     Diverticulosis     History of colonic polyps     HTN     History of paroxysmal atrial tachycardia         Immunizations:     Adacel (Tdap) 7/10/2014         Allergies:     Last Reviewed on 6/25/2018 09:12 AM by Jono Lowe      No Known Drug Allergies.         Current Medications:     Last Reviewed on 6/25/2018 09:14 AM by Jono Lowe    Fenofibrate 145mg Tablet One PO QD with a meal     Lisinopril/Hydrochlorothiazide 20mg/25mg Tablet one a day     Vitamin D3 2,000IU Capsules 1 capsule daily     Calcium Carbonate/Vitamin D  600mg/800IU Tablet Take 1 tablet(s) by mouth daily         OBJECTIVE:        Vitals:         Current: 6/25/2018 8:49:34 AM    Ht:  5 ft, 3 in;  Wt: 215.8 lbs;  BMI: 38.2    T: 97.9 F (oral);  BP: 133/81 mm Hg (left arm, sitting);  P: 73 bpm (left arm (BP Cuff), sitting);  sCr: 0.9 mg/dL;  GFR: 74.35        Exams:     PHYSICAL EXAM:     GENERAL: vital signs recorded - well developed, well nourished;  no apparent distress;     NECK: trachea is midline; thyroid is non-palpable;     RESPIRATORY: normal respiratory rate and pattern with no distress; normal breath sounds with no rales, rhonchi, wheezes or rubs;     CARDIOVASCULAR: normal rate; rhythm is regular;  no edema;     LYMPHATIC: no enlargement of cervical or facial nodes;     NEUROLOGIC: GROSSLY INTACT;         ASSESSMENT           272.1   E78.1  High triglycerides              DDx:     401.1   I10  HTN              DDx:     268.9   E55.9  Vitamin D deficiency              DDx:         ORDERS:         Lab Orders:       [id number]  COMP - [facility] Comp.  Metabolic Panel  (Send-Out)         51208  LPDP - [facility] Lipid Panel  (Send-Out)         69850  BDC - [facility] CBC with 3 part diff  (Send-Out)         41433  TSH - [facility] TSH  (Send-Out)         67829  VITD - [facility] Vitamin D, 25 Hydroxy  (Send-Out)         APPTO  Appointment need  (In-House)                   PLAN:          High triglycerides     LABORATORY:  Labs ordered to be performed today include Comprehensive metabolic panel and lipid panel.      FOLLOW-UP: Schedule a follow-up visit in 6 months..            Orders:       [id number]  COMP - [facility] Comp. Metabolic Panel  (Send-Out)         92027  LPDP - [facility] Lipid Panel  (Send-Out)         APPTO  Appointment need  (In-House)            HTN     LABORATORY:  Labs ordered to be performed today include CBC and TSH.            Or[facility]:       [id number] with 3 part diff  (Send-Out)         98290  TSH - [facility] TSH  (Send-Out)            Vitamin D deficiency     LABORATORY:  Labs ordered to be performed today include Vitamin D.            Orders:       [id number]  VITD - [facility] Vitamin D, 25 Hydroxy  (Send-Out)               Patient Recommendations:        For  High triglycerides:     Schedule a follow-up visit in 6 months.                APPOINTMENT INFORMATION:        Monday Tuesday Wednesday Thursday Friday Saturday Sunday            Time:___________________AM  PM   Date:_____________________             CHARGE CAPTURE           **Please note: ICD descriptions below are intended for billing purposes only and may not represent clinical diagnoses**        Primary Diagnosis:         272.1 High triglycerides            E78.1    Pure hyperglyceridemia              Orders:          94434   Office/outpatient visit; established patient, level 4  (In-House)             APPTO   Appointment need  (In-House)           401.1 HTN            I10    Essential (primary) hypertension    268.9 Vitamin D deficiency            E55.9    Vitamin D deficiency, unspecified

## 2021-05-18 NOTE — PROGRESS NOTES
Rivka Casillas 1955     Office/Outpatient Visit    Visit Date: Mon, Dec 3, 2018 09:00 am    Provider: Jono Lowe MD (Assistant: Africa Allen)    Location: [facility]        Electronically signed by Jono Lowe MD on  12/03/2018 01:38:07 PM                             SUBJECTIVE:        CC:     Mrs. Casillas is a 63 year old White female.  This is a follow-up visit.          HPI:         Patient to be evaluated for high triglycerides.  Current treatment includes a lipid lowering agent.  Compliance with treatment has been good.  Most recent lab tests include Total Cholesterol:  159 (mg/dL) (06/25/2018), HDL:  53 (mg/dL) (06/25/2018), Triglycerides:  108 (mg/dL) (06/25/2018), LDL:  84 (mg/dL) (06/25/2018).          Dx with hTN; her current cardiac medication regimen includes a diuretic and an ACE inhibitor.  She has not kept a blood pressure diary, but states that pressures have been well controlled.  Compliance with treatment has been good.      ROS:     CONSTITUTIONAL:  Negative for chills and fever.      E/N/T:  Negative for ear pain, nasal congestion and sore throat.      CARDIOVASCULAR:  Negative for chest pain, palpitations and pedal edema.      RESPIRATORY:  Negative for dyspnea.      GASTROINTESTINAL:  Positive for acid reflux symptoms and dysphagia.   Negative for abdominal pain, constipation, diarrhea, nausea or vomiting.      MUSCULOSKELETAL:  Negative for arthralgias and myalgias.      NEUROLOGICAL:  Positive for dizziness ( STILL OCCASIONAL, BRIEF, NEG EEG AND CAROTID DOPPLER.  HAS NOT HAD AN MRI OR CT. ), headaches ( tension; OCCASIONAL ) and memory loss (MILD LATELY).   Negative for fainting.          PMH/FMH/SH:     Last Reviewed on 12/03/2018 09:05 AM by Jono Lowe    Past Medical History:             CURRENT MEDICAL PROVIDERS:    Obstetrician/Gynecologist         PREVENTIVE HEALTH MAINTENANCE             COLORECTAL CANCER SCREENING: Up to date (colonoscopy  q10y; sigmoidoscopy q5y; Cologuard q3y) was last done 2018, Results are in chart; colonoscopy with normal results         Surgical History:     NONE         Tobacco/Alcohol/Supplements:     Last Reviewed on 12/03/2018 09:05 AM by Jono Lowe    Tobacco: She has never smoked.              Current Problems:     Last Reviewed on 12/03/2018 09:06 AM by Jono Lowe    Allergies     Osteoarthritis of ankle     Psoriasis     Vitamin D deficiency     High triglycerides     Diverticulosis     History of colonic polyps     HTN     History of paroxysmal atrial tachycardia     Near-syncope         Immunizations:     Adacel (Tdap) 7/10/2014         Allergies:     Last Reviewed on 12/03/2018 09:05 AM by Jono Lowe      No Known Drug Allergies.         Current Medications:     Last Reviewed on 12/03/2018 09:06 AM by Jono Lowe    Lisinopril/Hydrochlorothiazide 20mg/25mg Tablet one a day     Fenofibrate 145mg Tablet One PO QD with a meal     Vitamin D3 2,000IU Capsules 1 capsule daily     Calcium Carbonate/Vitamin D  600mg/800IU Tablet Take 1 tablet(s) by mouth daily         OBJECTIVE:        Vitals:         Current: 12/3/2018 9:02:24 AM    Ht:  5 ft, 3 in;  Wt: 221.8 lbs;  BMI: 39.3    T: 98.2 F (oral);  BP: 140/71 mm Hg (left arm, sitting);  P: 80 bpm (left arm (BP Cuff), sitting);  sCr: 0.83 mg/dL;  GFR: 80.55        Exams:     PHYSICAL EXAM:     GENERAL: vital signs recorded - well developed, well nourished;  no apparent distress;     EYES: extraocular movements intact; PERRL;     E/N/T:  normal EACs, TMs, nasal/oral mucosa, teeth, gingiva, and oropharynx;     NECK: trachea is midline; thyroid is non-palpable; carotid exam reveals no bruits;     RESPIRATORY: normal respiratory rate and pattern with no distress; normal breath sounds with no rales, rhonchi, wheezes or rubs;     CARDIOVASCULAR: normal rate; rhythm is regular;  no edema;     LYMPHATIC: no enlargement of cervical or  facial nodes;     MUSCULOSKELETAL: NEG RIGHT ANKLE EXAM;     NEUROLOGIC: GROSSLY INTACT;     PSYCHIATRIC:  appropriate affect and demeanor; normal speech pattern; grossly normal memory;         ASSESSMENT           272.1   E78.1  High triglycerides              DDx:     401.1   I10  HTN              DDx:     780.2   R55  Near-syncope              DDx:     530.81   K21.9  GERD              DDx:         ORDERS:         Meds Prescribed:       Omeprazole 40mg Capsules, Extended Release 1 capsule daily  #30 (Thirty) capsule(s) Refills: 3         Radiology/Test Orders:       45191  Magnetic resonance imaging, brain; with and without contrast  (Send-Out)                   PLAN:          High triglycerides         MEDICATIONS: (no change to current medication regimen)     FOLLOW-UP: Schedule a follow-up visit in 3 months. AND WITH NP FOR WWE          HTN         MEDICATIONS: (no change to current medication regimen)          Near-syncope         TESTS/PROCEDURES ordered today include MRI ( BRAIN W AND W/O CONTRAST AT BDX ).      CONSIDER NEUROLOGY EVAL           Orders:       [id number]  Magnetic resonance imaging, brain; with and without contrast  (Send-Out)            GERD         MEDICATIONS: Over-the-counter medications recommended include antacids.      MAY NEED EGD           Prescriptions:       Omeprazole 40mg Capsules, Extended Release 1 capsule daily  #30 (Thirty) capsule(s) Refills: 3             Patient Recommendations:        For  High triglycerides:     Schedule a follow-up visit in 3 months.          For  GERD:     Use over-the-counter antacids (i.e. Tums) for pain relief.              CHARGE CAPTURE           **Please note: ICD descriptions below are intended for billing purposes only and may not represent clinical diagnoses**        Primary Diagnosis:         272.1 High triglycerides            E78.1    Pure hyperglyceridemia              Orders:          22672   Office/outpatient visit; established patient,  level 4  (In-House)           401.1 HTN            I10    Essential (primary) hypertension    780.2 Near-syncope            R55    Syncope and collapse    530.81 GERD            K21.9    Gastro-esophageal reflux disease without esophagitis

## 2021-07-01 VITALS
WEIGHT: 221.8 LBS | TEMPERATURE: 98.2 F | HEIGHT: 63 IN | BODY MASS INDEX: 39.3 KG/M2 | SYSTOLIC BLOOD PRESSURE: 140 MMHG | HEART RATE: 80 BPM | DIASTOLIC BLOOD PRESSURE: 71 MMHG

## 2021-07-01 VITALS
BODY MASS INDEX: 39.02 KG/M2 | TEMPERATURE: 98.1 F | DIASTOLIC BLOOD PRESSURE: 71 MMHG | HEIGHT: 63 IN | HEART RATE: 74 BPM | SYSTOLIC BLOOD PRESSURE: 146 MMHG | WEIGHT: 220.2 LBS

## 2021-07-01 VITALS
HEIGHT: 63 IN | HEART RATE: 73 BPM | BODY MASS INDEX: 38.24 KG/M2 | DIASTOLIC BLOOD PRESSURE: 81 MMHG | TEMPERATURE: 97.9 F | WEIGHT: 215.8 LBS | SYSTOLIC BLOOD PRESSURE: 133 MMHG

## 2021-07-01 VITALS
HEART RATE: 82 BPM | WEIGHT: 212.6 LBS | DIASTOLIC BLOOD PRESSURE: 77 MMHG | HEIGHT: 63 IN | SYSTOLIC BLOOD PRESSURE: 131 MMHG | TEMPERATURE: 98.8 F | BODY MASS INDEX: 37.67 KG/M2

## 2021-07-01 VITALS
BODY MASS INDEX: 39.48 KG/M2 | TEMPERATURE: 98.5 F | HEIGHT: 63 IN | HEART RATE: 72 BPM | SYSTOLIC BLOOD PRESSURE: 147 MMHG | WEIGHT: 222.8 LBS | DIASTOLIC BLOOD PRESSURE: 63 MMHG

## 2021-07-01 VITALS
SYSTOLIC BLOOD PRESSURE: 143 MMHG | HEIGHT: 63 IN | TEMPERATURE: 98.4 F | DIASTOLIC BLOOD PRESSURE: 68 MMHG | HEART RATE: 81 BPM | BODY MASS INDEX: 39.58 KG/M2 | WEIGHT: 223.4 LBS

## 2021-07-02 VITALS
HEIGHT: 63 IN | SYSTOLIC BLOOD PRESSURE: 142 MMHG | HEART RATE: 80 BPM | WEIGHT: 208.2 LBS | DIASTOLIC BLOOD PRESSURE: 65 MMHG | BODY MASS INDEX: 36.89 KG/M2 | TEMPERATURE: 98.2 F

## 2021-07-02 VITALS
BODY MASS INDEX: 37.46 KG/M2 | TEMPERATURE: 98.6 F | DIASTOLIC BLOOD PRESSURE: 50 MMHG | WEIGHT: 211.4 LBS | HEART RATE: 85 BPM | HEIGHT: 63 IN | SYSTOLIC BLOOD PRESSURE: 134 MMHG

## 2021-07-02 VITALS
BODY MASS INDEX: 37.07 KG/M2 | HEIGHT: 63 IN | WEIGHT: 209.2 LBS | SYSTOLIC BLOOD PRESSURE: 145 MMHG | DIASTOLIC BLOOD PRESSURE: 60 MMHG | HEART RATE: 78 BPM

## 2021-07-02 VITALS
DIASTOLIC BLOOD PRESSURE: 70 MMHG | HEIGHT: 63 IN | SYSTOLIC BLOOD PRESSURE: 148 MMHG | BODY MASS INDEX: 37.81 KG/M2 | WEIGHT: 213.4 LBS | HEART RATE: 67 BPM | TEMPERATURE: 98.4 F

## 2021-08-11 RX ORDER — FENOFIBRATE 145 MG/1
145 TABLET, COATED ORAL DAILY
Qty: 90 TABLET | Refills: 3 | Status: SHIPPED | OUTPATIENT
Start: 2021-08-11 | End: 2022-08-31 | Stop reason: SDUPTHER

## 2021-08-11 NOTE — TELEPHONE ENCOUNTER
Pharmacy requesting a refill of Fenofibrate 145mg one daily. LV- 02/12/21, LF- 07/16/20, #90, 1 refill. Next appt 08/27/21. Last lab- 02/12/21. Approved.

## 2021-08-27 ENCOUNTER — OFFICE VISIT (OUTPATIENT)
Dept: FAMILY MEDICINE CLINIC | Age: 66
End: 2021-08-27

## 2021-08-27 ENCOUNTER — TRANSCRIBE ORDERS (OUTPATIENT)
Dept: ADMINISTRATIVE | Facility: HOSPITAL | Age: 66
End: 2021-08-27

## 2021-08-27 ENCOUNTER — LAB (OUTPATIENT)
Dept: LAB | Facility: HOSPITAL | Age: 66
End: 2021-08-27

## 2021-08-27 ENCOUNTER — TRANSCRIBE ORDERS (OUTPATIENT)
Dept: FAMILY MEDICINE CLINIC | Age: 66
End: 2021-08-27

## 2021-08-27 VITALS
DIASTOLIC BLOOD PRESSURE: 76 MMHG | SYSTOLIC BLOOD PRESSURE: 150 MMHG | BODY MASS INDEX: 37.78 KG/M2 | HEART RATE: 73 BPM | WEIGHT: 213.2 LBS | HEIGHT: 63 IN

## 2021-08-27 DIAGNOSIS — Z12.31 SCREENING MAMMOGRAM, ENCOUNTER FOR: ICD-10-CM

## 2021-08-27 DIAGNOSIS — E11.9 TYPE 2 DIABETES MELLITUS TREATED WITHOUT INSULIN (HCC): ICD-10-CM

## 2021-08-27 DIAGNOSIS — K21.9 GERD WITHOUT ESOPHAGITIS: ICD-10-CM

## 2021-08-27 DIAGNOSIS — Z12.31 SCREENING MAMMOGRAM, ENCOUNTER FOR: Primary | ICD-10-CM

## 2021-08-27 DIAGNOSIS — I10 ESSENTIAL HYPERTENSION: Primary | ICD-10-CM

## 2021-08-27 DIAGNOSIS — E78.00 HIGH CHOLESTEROL: ICD-10-CM

## 2021-08-27 LAB
ALBUMIN SERPL-MCNC: 4.4 G/DL (ref 3.5–5.2)
ALBUMIN/GLOB SERPL: 1.6 G/DL
ALP SERPL-CCNC: 58 U/L (ref 39–117)
ALT SERPL W P-5'-P-CCNC: 25 U/L (ref 1–33)
ANION GAP SERPL CALCULATED.3IONS-SCNC: 9.9 MMOL/L (ref 5–15)
AST SERPL-CCNC: 18 U/L (ref 1–32)
BILIRUB SERPL-MCNC: 0.3 MG/DL (ref 0–1.2)
BUN SERPL-MCNC: 17 MG/DL (ref 8–23)
BUN/CREAT SERPL: 20 (ref 7–25)
CALCIUM SPEC-SCNC: 9.7 MG/DL (ref 8.6–10.5)
CHLORIDE SERPL-SCNC: 102 MMOL/L (ref 98–107)
CHOLEST SERPL-MCNC: 175 MG/DL (ref 0–200)
CO2 SERPL-SCNC: 29.1 MMOL/L (ref 22–29)
CREAT SERPL-MCNC: 0.85 MG/DL (ref 0.57–1)
GFR SERPL CREATININE-BSD FRML MDRD: 67 ML/MIN/1.73
GLOBULIN UR ELPH-MCNC: 2.7 GM/DL
GLUCOSE SERPL-MCNC: 144 MG/DL (ref 65–99)
HBA1C MFR BLD: 7.21 % (ref 4.8–5.6)
HDLC SERPL-MCNC: 58 MG/DL (ref 40–60)
LDLC SERPL CALC-MCNC: 97 MG/DL (ref 0–100)
LDLC/HDLC SERPL: 1.62 {RATIO}
POTASSIUM SERPL-SCNC: 3.9 MMOL/L (ref 3.5–5.2)
PROT SERPL-MCNC: 7.1 G/DL (ref 6–8.5)
SODIUM SERPL-SCNC: 141 MMOL/L (ref 136–145)
TRIGL SERPL-MCNC: 114 MG/DL (ref 0–150)
VLDLC SERPL-MCNC: 20 MG/DL (ref 5–40)

## 2021-08-27 PROCEDURE — 80053 COMPREHEN METABOLIC PANEL: CPT | Performed by: FAMILY MEDICINE

## 2021-08-27 PROCEDURE — 36415 COLL VENOUS BLD VENIPUNCTURE: CPT | Performed by: FAMILY MEDICINE

## 2021-08-27 PROCEDURE — 80061 LIPID PANEL: CPT | Performed by: FAMILY MEDICINE

## 2021-08-27 PROCEDURE — 99214 OFFICE O/P EST MOD 30 MIN: CPT | Performed by: FAMILY MEDICINE

## 2021-08-27 PROCEDURE — 83036 HEMOGLOBIN GLYCOSYLATED A1C: CPT | Performed by: FAMILY MEDICINE

## 2021-08-27 RX ORDER — LISINOPRIL AND HYDROCHLOROTHIAZIDE 25; 20 MG/1; MG/1
1 TABLET ORAL DAILY
Qty: 90 TABLET | Refills: 3 | Status: SHIPPED | OUTPATIENT
Start: 2021-08-27 | End: 2022-08-31 | Stop reason: SDUPTHER

## 2021-08-27 RX ORDER — LISINOPRIL AND HYDROCHLOROTHIAZIDE 25; 20 MG/1; MG/1
1 TABLET ORAL DAILY
COMMUNITY
Start: 2021-06-06 | End: 2021-08-27 | Stop reason: SDUPTHER

## 2021-08-27 RX ORDER — MELOXICAM 15 MG/1
15 TABLET ORAL DAILY PRN
COMMUNITY

## 2021-08-27 RX ORDER — IBUPROFEN 400 MG/1
TABLET ORAL
COMMUNITY
End: 2021-08-27

## 2021-08-27 RX ORDER — CALCIUM CITRATE 1040MG
TABLET ORAL
COMMUNITY

## 2021-08-27 NOTE — PROGRESS NOTES
Chief Complaint  FOLLOUP OF CHRONIC PROBLEMS     Subjective          Rivka Casillas presents to [facility]  --LAST HGA1C WAS < 7 %, NOT CHECKING HER SUGARS REGULARLY  --TOLERATING BP MEDS WITHOUT APPARENT SIDE EFFECTS, NOT CHECKING HER BP REGULARLY  --NO GERD ISSUES AT THIS TIME  --DUE FOR A MAMMOGRAM.  HAS NOTICED NO LUMPS OR TENDERNESS  --LAST LIPIDS WERE OK         Not on File     Health Maintenance Due   Topic Date Due   • Pneumococcal Vaccine 65+ (1 of 2 - PPSV23) Never done   • ZOSTER VACCINE (1 of 2) Never done   • URINE MICROALBUMIN  02/28/2021   • DXA SCAN  03/04/2021   • DIABETIC FOOT EXAM  Never done   • DIABETIC EYE EXAM  Never done   • HEMOGLOBIN A1C  08/12/2021        Current Outpatient Medications on File Prior to Visit   Medication Sig   • ascorbic acid (VITAMIN C) 1000 MG tablet    • Calcium Citrate 1040 MG tablet    • Cholecalciferol 50 MCG (2000 UT) capsule Vitamin D3 2,000 unit oral capsule take 1 capsule by oral route daily   Active   • fenofibrate (TRICOR) 145 MG tablet Take 1 tablet by mouth Daily for 90 days.   • meloxicam (MOBIC) 15 MG tablet meloxicam 15 mg oral tablet take 1 tablet (15 mg) by oral route once daily   Active   • [DISCONTINUED] lisinopril-hydrochlorothiazide (PRINZIDE,ZESTORETIC) 20-25 MG per tablet Take 1 tablet by mouth Daily.   • [DISCONTINUED] ibuprofen (ADVIL,MOTRIN) 400 MG tablet      No current facility-administered medications on file prior to visit.       Immunization History   Administered Date(s) Administered   • COVID-19 (PFIZER) 03/31/2021, 04/21/2021   • Hepatitis A 12/28/2018, 07/13/2019   • Influenza, Unspecified 02/12/2021   • Tdap 07/10/2014       Review of Systems   Constitutional: Negative for appetite change, chills, fatigue and fever.   HENT: Negative for ear pain, rhinorrhea and sore throat.    Respiratory: Negative for cough and shortness of breath.    Cardiovascular: Negative for chest pain, palpitations and leg  "swelling.   Gastrointestinal: Negative for abdominal pain, nausea and vomiting.   Genitourinary: Negative for dysuria and hematuria.   Musculoskeletal: Negative for arthralgias and myalgias.   Neurological: Negative for headache.   Psychiatric/Behavioral: Negative for depressed mood.        Objective     /76 (BP Location: Right arm, Patient Position: Sitting, Cuff Size: Large Adult)   Pulse 73   Ht 160 cm (63\")   Wt 96.7 kg (213 lb 3.2 oz)   BMI 37.77 kg/m²       Physical Exam  Vitals and nursing note reviewed.   Constitutional:       General: She is not in acute distress.     Appearance: Normal appearance.   Cardiovascular:      Rate and Rhythm: Normal rate and regular rhythm.      Heart sounds: No murmur heard.     Pulmonary:      Effort: Pulmonary effort is normal.      Breath sounds: Normal breath sounds.   Abdominal:      Palpations: Abdomen is soft.      Tenderness: There is no abdominal tenderness.   Musculoskeletal:         General: No swelling.      Cervical back: Neck supple.   Lymphadenopathy:      Cervical: No cervical adenopathy.   Neurological:      General: No focal deficit present.      Mental Status: She is alert.      Cranial Nerves: No cranial nerve deficit.      Coordination: Coordination normal.      Gait: Gait normal.   Psychiatric:         Mood and Affect: Mood normal.         Behavior: Behavior normal.         Result Review :                             Assessment and Plan      Diagnoses and all orders for this visit:    1. Essential hypertension (Primary)  Assessment & Plan:  Hypertension is improving with treatment.  Continue current treatment regimen.  Dietary sodium restriction.  Weight loss.  Regular aerobic exercise.  Blood pressure will be reassessed at the next regular appointment.    Orders:  -     lisinopril-hydrochlorothiazide (PRINZIDE,ZESTORETIC) 20-25 MG per tablet; Take 1 tablet by mouth Daily.  Dispense: 90 tablet; Refill: 3    2. GERD without esophagitis  Assessment " & Plan:  CONTINUE SAME MEDS       3. High cholesterol  Assessment & Plan:  Lipid abnormalities are improving with treatment.  Nutritional counseling was provided.  Lipids will be reassessed in 6 months.      4. Screening mammogram, encounter for  -     Mammo Screening Bilateral With CAD; Future    5. Type 2 diabetes mellitus treated without insulin (CMS/[facility])  Assessment & Plan:  Diabetes is improving with treatment.   Continue current treatment regimen.  Regular aerobic exercise.  Diabetes will be reassessed in 6 months.    Orders:  -     Comprehensive Metabolic Panel  -     Lipid Panel  -     Hemoglobin A1c          Follow Up     Return in about 6 months (around 2/27/2022).    Patient was given instructions and counseling regarding her condition or for health maintenance advice. Please see specific information pulled into the AVS if appropriate.

## 2021-08-27 NOTE — PROGRESS NOTES
Chief Complaint  Medicare Wellness-subsequent (Yearly [facility])    Subjective     {Problem List  Visit Diagnosis   Encounters  Notes  Medications  Labs  Result Review Imaging  Media :23}     Rivka Casillas presents to [facility]  --LAST HGA1C WAS < 7 %.  DOES NOT CHECK HER SUGARS REGULARLY  --TOLERATING BP MEDS WITHOUT APPARENT SIDE EFFECTS, DOES NOT CHECK HER BP REGULARLY  --NO ISSUES WITH GERD AT THIS TIME  --DUE FOR A MAMMOGRAM.  HAS NOTICED NO LUMPS OR TENDERNESS  --LAST LIPIDS WERE OK           Not on File     Health Maintenance Due   Topic Date Due   • Pneumococcal Vaccine 65+ (1 of 2 - PPSV23) Never done   • ZOSTER VACCINE (1 of 2) Never done   • URINE MICROALBUMIN  02/28/2021   • DXA SCAN  03/04/2021   • DIABETIC FOOT EXAM  Never done   • DIABETIC EYE EXAM  Never done   • HEMOGLOBIN A1C  08/12/2021        Current Outpatient Medications on File Prior to Visit   Medication Sig   • ascorbic acid (VITAMIN C) 1000 MG tablet    • Calcium Citrate 1040 MG tablet    • Cholecalciferol 50 MCG (2000 UT) capsule Vitamin D3 2,000 unit oral capsule take 1 capsule by oral route daily   Active   • fenofibrate (TRICOR) 145 MG tablet Take 1 tablet by mouth Daily for 90 days.   • meloxicam (MOBIC) 15 MG tablet meloxicam 15 mg oral tablet take 1 tablet (15 mg) by oral route once daily   Active   • [DISCONTINUED] lisinopril-hydrochlorothiazide (PRINZIDE,ZESTORETIC) 20-25 MG per tablet Take 1 tablet by mouth Daily.   • [DISCONTINUED] ibuprofen (ADVIL,MOTRIN) 400 MG tablet      No current facility-administered medications on file prior to visit.       Immunization History   Administered Date(s) Administered   • COVID-19 (PFIZER) 03/31/2021, 04/21/2021   • Hepatitis A 12/28/2018, 07/13/2019   • Influenza, Unspecified 02/12/2021   • Tdap 07/10/2014       Review of Systems   Constitutional: Negative for appetite change, chills, fatigue and fever.   HENT: Negative for ear pain, rhinorrhea and  "sore throat.    Respiratory: Negative for cough and shortness of breath.    Cardiovascular: Negative for chest pain, palpitations and leg swelling.   Gastrointestinal: Negative for abdominal pain, nausea and vomiting.   Musculoskeletal: Negative for arthralgias and myalgias.   Neurological: Negative for headache.        Objective     /76 (BP Location: Right arm, Patient Position: Sitting, Cuff Size: Large Adult)   Pulse 73   Ht 160 cm (63\")   Wt 96.7 kg (213 lb 3.2 oz)   BMI 37.77 kg/m²       Physical Exam  Vitals and nursing note reviewed.   Constitutional:       General: She is not in acute distress.  HENT:      Right Ear: Tympanic membrane normal.   Eyes:      Conjunctiva/sclera: Conjunctivae normal.   Cardiovascular:      Rate and Rhythm: Normal rate and regular rhythm.      Heart sounds: No murmur heard.     Pulmonary:      Effort: Pulmonary effort is normal.      Breath sounds: Normal breath sounds.   Abdominal:      Palpations: Abdomen is soft.      Tenderness: There is no abdominal tenderness.   Musculoskeletal:         General: No swelling.      Cervical back: Neck supple.   Lymphadenopathy:      Cervical: No cervical adenopathy.   Neurological:      General: No focal deficit present.      Mental Status: She is alert.      Cranial Nerves: No cranial nerve deficit.      Coordination: Coordination normal.      Gait: Gait normal.   Psychiatric:         Mood and Affect: Mood normal.         Behavior: Behavior normal.         Result Review :{Labs  Result Review  Imaging  Med Tab  Media :23}     {The following data was reviewed by (Optional):59367}    {Ambulatory Labs (Optional):92577}    {Data reviewed (Optional):58992:::1}                Assessment and Plan {CC Problem List  Visit Diagnosis  ROS  Review (Popup)  [facility] Maintenance  Quality  BestPractice  Medications  SmartSets  SnapShot Encounters  Media :23}     Diagnoses and all orders for this visit:    1. Essential hypertension " (Primary)  Assessment & Plan:  Hypertension is improving with treatment.  Continue current treatment regimen.  Dietary sodium restriction.  Weight loss.  Regular aerobic exercise.  Blood pressure will be reassessed at the next regular appointment.    Orders:  -     lisinopril-hydrochlorothiazide (PRINZIDE,ZESTORETIC) 20-25 MG per tablet; Take 1 tablet by mouth Daily.  Dispense: 90 tablet; Refill: 3    2. GERD without esophagitis  Assessment & Plan:  CONTINUE SAME MEDS       3. High cholesterol  Assessment & Plan:  Lipid abnormalities are improving with treatment.  Nutritional counseling was provided.  Lipids will be reassessed in 6 months.      4. Screening mammogram, encounter for  -     Mammo Screening Bilateral With CAD; Future    5. Type 2 diabetes mellitus treated without insulin (CMS/[facility])  Assessment & Plan:  Diabetes is improving with treatment.   Continue current treatment regimen.  Regular aerobic exercise.  Diabetes will be reassessed in 6 months.    Orders:  -     Comprehensive Metabolic Panel  -     Lipid Panel  -     Hemoglobin A1c      {Time Spent (Optional):59083}    Follow Up {Instructions Charge Capture  Follow-up Communications :23}    Return in about 6 months (around 2/27/2022).    Patient was given instructions and counseling regarding her condition or for health maintenance advice. Please see specific information pulled into the AVS if appropriate.

## 2021-09-22 ENCOUNTER — HOSPITAL ENCOUNTER (OUTPATIENT)
Dept: MAMMOGRAPHY | Facility: HOSPITAL | Age: 66
Discharge: HOME | End: 2021-09-22
Admitting: FAMILY MEDICINE

## 2021-09-22 DIAGNOSIS — Z12.31 SCREENING MAMMOGRAM, ENCOUNTER FOR: ICD-10-CM

## 2021-09-22 PROCEDURE — 77067 SCR MAMMO BI INCL CAD: CPT

## 2021-09-22 PROCEDURE — 77063 BREAST TOMOSYNTHESIS BI: CPT

## 2022-02-28 ENCOUNTER — OFFICE VISIT (OUTPATIENT)
Dept: FAMILY MEDICINE CLINIC | Age: 67
End: 2022-02-28

## 2022-02-28 ENCOUNTER — LAB (OUTPATIENT)
Dept: LAB | Facility: HOSPITAL | Age: 67
End: 2022-02-28

## 2022-02-28 VITALS
WEIGHT: 218.2 LBS | HEIGHT: 63 IN | HEART RATE: 74 BPM | DIASTOLIC BLOOD PRESSURE: 71 MMHG | SYSTOLIC BLOOD PRESSURE: 140 MMHG | TEMPERATURE: 98.4 F | BODY MASS INDEX: 38.66 KG/M2

## 2022-02-28 DIAGNOSIS — K21.9 GERD WITHOUT ESOPHAGITIS: ICD-10-CM

## 2022-02-28 DIAGNOSIS — Z00.00 MEDICARE ANNUAL WELLNESS VISIT, SUBSEQUENT: Primary | ICD-10-CM

## 2022-02-28 DIAGNOSIS — E78.00 HIGH CHOLESTEROL: ICD-10-CM

## 2022-02-28 DIAGNOSIS — Z23 NEED FOR COVID-19 VACCINE: ICD-10-CM

## 2022-02-28 DIAGNOSIS — I10 ESSENTIAL HYPERTENSION: ICD-10-CM

## 2022-02-28 DIAGNOSIS — E11.9 TYPE 2 DIABETES MELLITUS TREATED WITHOUT INSULIN: ICD-10-CM

## 2022-02-28 PROBLEM — Z12.31 SCREENING MAMMOGRAM, ENCOUNTER FOR: Status: RESOLVED | Noted: 2021-08-27 | Resolved: 2022-02-28

## 2022-02-28 LAB
ALBUMIN SERPL-MCNC: 4.5 G/DL (ref 3.5–5.2)
ALBUMIN/GLOB SERPL: 1.9 G/DL
ALP SERPL-CCNC: 49 U/L (ref 39–117)
ALT SERPL W P-5'-P-CCNC: 21 U/L (ref 1–33)
ANION GAP SERPL CALCULATED.3IONS-SCNC: 8.6 MMOL/L (ref 5–15)
AST SERPL-CCNC: 13 U/L (ref 1–32)
BILIRUB SERPL-MCNC: 0.4 MG/DL (ref 0–1.2)
BILIRUB UR QL STRIP: NEGATIVE
BUN SERPL-MCNC: 24 MG/DL (ref 8–23)
BUN/CREAT SERPL: 28.6 (ref 7–25)
CALCIUM SPEC-SCNC: 9.9 MG/DL (ref 8.6–10.5)
CHLORIDE SERPL-SCNC: 104 MMOL/L (ref 98–107)
CHOLEST SERPL-MCNC: 164 MG/DL (ref 0–200)
CLARITY UR: CLEAR
CO2 SERPL-SCNC: 29.4 MMOL/L (ref 22–29)
COLOR UR: YELLOW
CREAT SERPL-MCNC: 0.84 MG/DL (ref 0.57–1)
DEPRECATED RDW RBC AUTO: 40.8 FL (ref 37–54)
EGFRCR SERPLBLD CKD-EPI 2021: 76.8 ML/MIN/1.73
ERYTHROCYTE [DISTWIDTH] IN BLOOD BY AUTOMATED COUNT: 12.2 % (ref 12.3–15.4)
GLOBULIN UR ELPH-MCNC: 2.4 GM/DL
GLUCOSE SERPL-MCNC: 173 MG/DL (ref 65–99)
GLUCOSE UR STRIP-MCNC: NEGATIVE MG/DL
HBA1C MFR BLD: 7.8 % (ref 4.8–5.6)
HCT VFR BLD AUTO: 41.1 % (ref 34–46.6)
HDLC SERPL-MCNC: 55 MG/DL (ref 40–60)
HGB BLD-MCNC: 13.6 G/DL (ref 12–15.9)
HGB UR QL STRIP.AUTO: NEGATIVE
KETONES UR QL STRIP: NEGATIVE
LDLC SERPL CALC-MCNC: 85 MG/DL (ref 0–100)
LDLC/HDLC SERPL: 1.48 {RATIO}
LEUKOCYTE ESTERASE UR QL STRIP.AUTO: NEGATIVE
MCH RBC QN AUTO: 30.1 PG (ref 26.6–33)
MCHC RBC AUTO-ENTMCNC: 33.1 G/DL (ref 31.5–35.7)
MCV RBC AUTO: 90.9 FL (ref 79–97)
NITRITE UR QL STRIP: NEGATIVE
PH UR STRIP.AUTO: 7 [PH] (ref 5–8)
PLATELET # BLD AUTO: 281 10*3/MM3 (ref 140–450)
PMV BLD AUTO: 10.1 FL (ref 6–12)
POTASSIUM SERPL-SCNC: 3.9 MMOL/L (ref 3.5–5.2)
PROT SERPL-MCNC: 6.9 G/DL (ref 6–8.5)
PROT UR QL STRIP: NEGATIVE
RBC # BLD AUTO: 4.52 10*6/MM3 (ref 3.77–5.28)
SODIUM SERPL-SCNC: 142 MMOL/L (ref 136–145)
SP GR UR STRIP: 1.02 (ref 1–1.03)
TRIGL SERPL-MCNC: 137 MG/DL (ref 0–150)
UROBILINOGEN UR QL STRIP: NORMAL
VLDLC SERPL-MCNC: 24 MG/DL (ref 5–40)
WBC NRBC COR # BLD: 6.57 10*3/MM3 (ref 3.4–10.8)

## 2022-02-28 PROCEDURE — G0439 PPPS, SUBSEQ VISIT: HCPCS | Performed by: FAMILY MEDICINE

## 2022-02-28 PROCEDURE — 91305 COVID-19 (PFIZER) 12+ YRS: CPT | Performed by: FAMILY MEDICINE

## 2022-02-28 PROCEDURE — 36415 COLL VENOUS BLD VENIPUNCTURE: CPT | Performed by: FAMILY MEDICINE

## 2022-02-28 PROCEDURE — 0053A COVID-19 (PFIZER) 12+ YRS: CPT | Performed by: FAMILY MEDICINE

## 2022-02-28 PROCEDURE — 99214 OFFICE O/P EST MOD 30 MIN: CPT | Performed by: FAMILY MEDICINE

## 2022-02-28 PROCEDURE — 83036 HEMOGLOBIN GLYCOSYLATED A1C: CPT | Performed by: FAMILY MEDICINE

## 2022-02-28 PROCEDURE — 1170F FXNL STATUS ASSESSED: CPT | Performed by: FAMILY MEDICINE

## 2022-02-28 PROCEDURE — 85027 COMPLETE CBC AUTOMATED: CPT | Performed by: FAMILY MEDICINE

## 2022-02-28 PROCEDURE — 80061 LIPID PANEL: CPT | Performed by: FAMILY MEDICINE

## 2022-02-28 PROCEDURE — 1159F MED LIST DOCD IN RCRD: CPT | Performed by: FAMILY MEDICINE

## 2022-02-28 PROCEDURE — 80053 COMPREHEN METABOLIC PANEL: CPT | Performed by: FAMILY MEDICINE

## 2022-02-28 PROCEDURE — 81003 URINALYSIS AUTO W/O SCOPE: CPT | Performed by: FAMILY MEDICINE

## 2022-02-28 RX ORDER — OMEPRAZOLE 40 MG/1
1 CAPSULE, DELAYED RELEASE ORAL
COMMUNITY

## 2022-02-28 RX ORDER — ASPIRIN 81 MG/1
1 TABLET ORAL DAILY
COMMUNITY

## 2022-02-28 NOTE — PROGRESS NOTES
The ABCs of the Annual Wellness Visit  Subsequent Medicare Wellness Visit    Chief Complaint   Patient presents with   • Medicare Wellness-subsequent      Subjective    History of Present Illness:  Rivka Casillas is a 66 y.o. female who presents for a Subsequent Medicare Wellness Visit.    --TOLERATING BP MEDS WITHOUT APPARENT SIDE EFFECTS  --LAST HGA1C WAS INCREASED TO > 7 %.  STATES SUGARS AT HOME HAS BEEN AS HIGH   --LAST LIPIDS WERE OK, NO ISSUES WITH MEDS  --GERD IS WELL-CONTROLLED WITH THE PPI  --LAST MAMMOGRAM WAS OK  --DUE FOR ROUTINE LABS    The following portions of the patient's history were reviewed and   updated as appropriate: allergies, current medications, past family history, past medical history, past social history, past surgical history and problem list.    Compared to one year ago, the patient feels her physical   health is the same.    Compared to one year ago, the patient feels her mental   health is the same.    Recent Hospitalizations:  She was not admitted to the hospital during the last year.       Current Medical Providers:  Patient Care Team:  Jono Lowe MD as PCP - General (Family Medicine)    Outpatient Medications Prior to Visit   Medication Sig Dispense Refill   • ascorbic acid (VITAMIN C) 1000 MG tablet      • Calcium Citrate 1040 MG tablet      • Cholecalciferol 50 MCG (2000 UT) capsule Vitamin D3 2,000 unit oral capsule take 1 capsule by oral route daily   Active     • fenofibrate (TRICOR) 145 MG tablet Take 1 tablet by mouth Daily for 90 days. 90 tablet 3   • lisinopril-hydrochlorothiazide (PRINZIDE,ZESTORETIC) 20-25 MG per tablet Take 1 tablet by mouth Daily. 90 tablet 3   • meloxicam (MOBIC) 15 MG tablet meloxicam 15 mg oral tablet take 1 tablet (15 mg) by oral route once daily   Active     • aspirin (aspirin) 81 MG EC tablet Take 1 tablet by mouth Daily.     • omeprazole (priLOSEC) 40 MG capsule Take 1 capsule by mouth.       No facility-administered  "medications prior to visit.       No opioid medication identified on active medication list. I have reviewed chart for other potential  high risk medication/s and harmful drug interactions in the elderly.          Aspirin is on active medication list. Aspirin use is indicated based on review of current medical condition/s. Pros and cons of this therapy have been discussed today. Benefits of this medication outweigh potential harm.  Patient has been encouraged to continue taking this medication.  .      Patient Active Problem List   Diagnosis   • Type 2 diabetes mellitus treated without insulin (HCC)   • Essential hypertension   • High cholesterol   • GERD without esophagitis   • Medicare annual wellness visit, subsequent     Advance Care Planning  Advance Directive is not on file.  ACP discussion was held with the patient during this visit. Patient has an advance directive (not in EMR), copy requested.    Review of Systems   Constitutional: Negative for activity change, appetite change, chills, fatigue and fever.   HENT: Negative for congestion, ear pain, hearing loss, rhinorrhea and sore throat.    Eyes: Negative for discharge and visual disturbance.   Respiratory: Negative for cough and shortness of breath.    Cardiovascular: Negative for chest pain, palpitations and leg swelling.   Gastrointestinal: Negative for abdominal pain, diarrhea, nausea and vomiting.   Genitourinary: Negative for dysuria and hematuria.   Musculoskeletal: Negative for arthralgias and myalgias.   Psychiatric/Behavioral: Negative for dysphoric mood.        Objective    Vitals:    02/28/22 0803   BP: 140/71   BP Location: Left arm   Patient Position: Sitting   Pulse: 74   Temp: 98.4 °F (36.9 °C)   TempSrc: Oral   Weight: 99 kg (218 lb 3.2 oz)   Height: 160 cm (63\")     BMI Readings from Last 1 Encounters:   02/28/22 38.65 kg/m²   BMI is above normal parameters. Recommendations include: nutrition counseling    Does the patient have evidence of " cognitive impairment? No    Physical Exam  Vitals and nursing note reviewed.   Constitutional:       General: She is not in acute distress.     Appearance: Normal appearance.   HENT:      Right Ear: Tympanic membrane normal.      Left Ear: Tympanic membrane normal.      Mouth/Throat:      Pharynx: Oropharynx is clear.   Eyes:      Conjunctiva/sclera: Conjunctivae normal.   Cardiovascular:      Rate and Rhythm: Normal rate and regular rhythm.      Heart sounds: Normal heart sounds. No murmur heard.      Pulmonary:      Effort: Pulmonary effort is normal.      Breath sounds: Normal breath sounds.   Abdominal:      General: Bowel sounds are normal.      Palpations: Abdomen is soft.      Tenderness: There is no abdominal tenderness.   Musculoskeletal:      Cervical back: Neck supple.      Right lower leg: No edema.      Left lower leg: No edema.   Lymphadenopathy:      Cervical: No cervical adenopathy.   Neurological:      General: No focal deficit present.      Mental Status: She is alert.      Cranial Nerves: No cranial nerve deficit.      Coordination: Coordination normal.      Gait: Gait normal.   Psychiatric:         Mood and Affect: Mood normal.         Behavior: Behavior normal.                 HEALTH RISK ASSESSMENT    Smoking Status:  Social History     Tobacco Use   Smoking Status Never Smoker   Smokeless Tobacco Never Used     Alcohol Consumption:  Social History     Substance and Sexual Activity   Alcohol Use Never     Fall Risk Screen:    [facility] Fall Risk Assessment has not been completed.    Depression Screening:  PHQ-2/PHQ-9 Depression Screening 2/28/2022   Little interest or pleasure in doing things 0   Feeling down, depressed, or hopeless 0   Total Score 0       Health Habits and Functional and Cognitive Screening:  Functional & Cognitive Status 2/28/2022   Do you have difficulty preparing food and eating? No   Do you have difficulty bathing yourself, getting dressed or grooming yourself? No   Do you  have difficulty using the toilet? No   Do you have difficulty moving around from place to place? No   Do you have trouble with steps or getting out of a bed or a chair? No   Current Diet Well Balanced Diet   Dental Exam Up to date   Eye Exam Up to date   Exercise (times per week) 4 times per week   Current Exercises Include Walking;House Cleaning   Do you need help using the phone?  No   Are you deaf or do you have serious difficulty hearing?  No   Do you need help with transportation? No   Do you need help shopping? No   Do you need help preparing meals?  No   Do you need help with housework?  No   Do you need help with laundry? No   Do you need help taking your medications? No   Do you need help managing money? No   Do you ever drive or ride in a car without wearing a seat belt? No   Have you felt unusual stress, anger or loneliness in the last month? No   Who do you live with? Spouse   If you need help, do you have trouble finding someone available to you? No   Have you been bothered in the last four weeks by sexual problems? -   Do you have difficulty concentrating, remembering or making decisions? No       Age-appropriate Screening Schedule:  Refer to the list below for future screening recommendations based on patient's age, sex and/or medical conditions. Orders for these recommended tests are listed in the plan section. The patient has been provided with a written plan.    Health Maintenance   Topic Date Due   • ZOSTER VACCINE (1 of 2) Never done   • URINE MICROALBUMIN  02/28/2021   • DXA SCAN  03/04/2021   • DIABETIC FOOT EXAM  Never done   • HEMOGLOBIN A1C  02/27/2022   • INFLUENZA VACCINE  02/28/2023 (Originally 8/1/2021)   • LIPID PANEL  08/27/2022   • DIABETIC EYE EXAM  12/09/2022   • MAMMOGRAM  09/22/2023   • TDAP/TD VACCINES (2 - Td or Tdap) 07/10/2024              Assessment/Plan   CMS Preventative Services Quick Reference  Risk Factors Identified During Encounter  Cardiovascular  Disease  Dementia/Memory   Depression/Dysphoria  Fall Risk-High or Moderate  Glaucoma or Family History of Glaucoma  Hearing Problem  Immunizations Discussed/Encouraged (specific Immunizations; Influenza and COVID19  The above risks/problems have been discussed with the patient.  Follow up actions/plans if indicated are seen below in the Assessment/Plan Section.  Pertinent information has been shared with the patient in the After Visit Summary.    Diagnoses and all orders for this visit:    1. Medicare annual wellness visit, subsequent (Primary)  Assessment & Plan:  ADVICE GIVEN RE:  SEATBELT USE, ALCOHOL USE, HEALTHY DIET, ROUTINE EYE AND DENTAL EXAM. ADVICE GIVEN RE:  SEATBELT USE, ALCOHOL USE, HEALTHY DIET, ROUTINE EYE AND DENTAL EXAM, ROUTINE VACCINATIONS, BREAST SELF-EXAMINATION.  RECENT MAMMOGRAM REPORT REVIEWED      2. Essential hypertension  Assessment & Plan:  Hypertension is improving with treatment.  Continue current treatment regimen.  Dietary sodium restriction.  Weight loss.  Regular aerobic exercise.  Continue current medications.  Blood pressure will be reassessed at the next regular appointment.    Orders:  -     CBC (No Diff)  -     Comprehensive Metabolic Panel  -     Urinalysis With Culture If Indicated -    3. GERD without esophagitis  Assessment & Plan:  IMPROVED WITH CURRENT TREATMENT, CONTINUE SAME, WILL REEVALUATE AT NEXT VISIT       4. High cholesterol  Assessment & Plan:  Lipid abnormalities are improving with treatment.  Nutritional counseling was provided.  Lipids will be reassessed in 6 months.    Orders:  -     Lipid Panel    5. Type 2 diabetes mellitus treated without insulin (HCC)  Assessment & Plan:  Diabetes is unchanged.   Continue current treatment regimen.  Dietary recommendations for ADA diet.  Diabetes will be reassessed in 6 months   NOT AT GOAL, WILL RECHECK LABS AND ADJUST MEDS IF INDICATED.    Orders:  -     Hemoglobin A1c    6. Need for COVID-19 vaccine  -     COVID-19  Vaccine (Pfizer) Lopez Cap      Follow Up:   Return in about 6 months (around 8/28/2022).     An After Visit Summary and PPPS were made available to the patient.

## 2022-02-28 NOTE — ASSESSMENT & PLAN NOTE
Diabetes is unchanged.   Continue current treatment regimen.  Dietary recommendations for ADA diet.  Diabetes will be reassessed in 6 months   NOT AT GOAL, WILL RECHECK LABS AND ADJUST MEDS IF INDICATED.

## 2022-02-28 NOTE — ASSESSMENT & PLAN NOTE
ADVICE GIVEN RE:  SEATBELT USE, ALCOHOL USE, HEALTHY DIET, ROUTINE EYE AND DENTAL EXAM. ADVICE GIVEN RE:  SEATBELT USE, ALCOHOL USE, HEALTHY DIET, ROUTINE EYE AND DENTAL EXAM, ROUTINE VACCINATIONS, BREAST SELF-EXAMINATION.  RECENT MAMMOGRAM REPORT REVIEWED

## 2022-03-01 DIAGNOSIS — E78.00 HIGH CHOLESTEROL: Primary | ICD-10-CM

## 2022-03-01 DIAGNOSIS — E11.9 TYPE 2 DIABETES MELLITUS TREATED WITHOUT INSULIN: ICD-10-CM

## 2022-03-04 ENCOUNTER — TELEPHONE (OUTPATIENT)
Dept: FAMILY MEDICINE CLINIC | Age: 67
End: 2022-03-04

## 2022-03-04 DIAGNOSIS — E11.9 TYPE 2 DIABETES MELLITUS TREATED WITHOUT INSULIN: Primary | ICD-10-CM

## 2022-03-04 RX ORDER — LANCETS 30 GAUGE
EACH MISCELLANEOUS
Qty: 200 EACH | Refills: 3 | Status: SHIPPED | OUTPATIENT
Start: 2022-03-04

## 2022-03-04 NOTE — TELEPHONE ENCOUNTER
[facility] faxed a letter stating Contour next test strips are not covered by her insurance.  Sent a new script for meter, test strips and lancets for whichever her insurance covers.

## 2022-03-07 ENCOUNTER — TELEPHONE (OUTPATIENT)
Dept: FAMILY MEDICINE CLINIC | Age: 67
End: 2022-03-07

## 2022-03-07 NOTE — TELEPHONE ENCOUNTER
Caller: Rivka Casillas    Relationship: Self    Best call back number: [phone]    What is the best time to reach you: ANYTIME    Who are you requesting to speak with (clinical staff, provider,  specific staff member): CLINICAL      What was the call regarding: PATIENT WANTING TO LET YOU KNOW THAT HER STRIPS ARE REQUIRING A PRIOR AUTHORIZATION    Do you require a callback: YES

## 2022-03-09 NOTE — TELEPHONE ENCOUNTER
Sent to [facility] on 03/04/22 to fill with whichever strips is covered by her insurance.  Pt informed.

## 2022-06-13 ENCOUNTER — TELEPHONE (OUTPATIENT)
Dept: FAMILY MEDICINE CLINIC | Age: 67
End: 2022-06-13

## 2022-06-24 ENCOUNTER — LAB (OUTPATIENT)
Dept: LAB | Facility: HOSPITAL | Age: 67
End: 2022-06-24

## 2022-06-24 DIAGNOSIS — E11.9 TYPE 2 DIABETES MELLITUS TREATED WITHOUT INSULIN: ICD-10-CM

## 2022-06-24 DIAGNOSIS — E78.00 HIGH CHOLESTEROL: ICD-10-CM

## 2022-06-24 LAB
ALBUMIN SERPL-MCNC: 4.4 G/DL (ref 3.5–5.2)
ALBUMIN/GLOB SERPL: 1.8 G/DL
ALP SERPL-CCNC: 50 U/L (ref 39–117)
ALT SERPL W P-5'-P-CCNC: 23 U/L (ref 1–33)
ANION GAP SERPL CALCULATED.3IONS-SCNC: 8.8 MMOL/L (ref 5–15)
AST SERPL-CCNC: 20 U/L (ref 1–32)
BILIRUB SERPL-MCNC: 0.5 MG/DL (ref 0–1.2)
BUN SERPL-MCNC: 21 MG/DL (ref 8–23)
BUN/CREAT SERPL: 22.8 (ref 7–25)
CALCIUM SPEC-SCNC: 10.3 MG/DL (ref 8.6–10.5)
CHLORIDE SERPL-SCNC: 102 MMOL/L (ref 98–107)
CHOLEST SERPL-MCNC: 149 MG/DL (ref 0–200)
CO2 SERPL-SCNC: 29.2 MMOL/L (ref 22–29)
CREAT SERPL-MCNC: 0.92 MG/DL (ref 0.57–1)
EGFRCR SERPLBLD CKD-EPI 2021: 68.8 ML/MIN/1.73
GLOBULIN UR ELPH-MCNC: 2.4 GM/DL
GLUCOSE SERPL-MCNC: 167 MG/DL (ref 65–99)
HBA1C MFR BLD: 7.8 % (ref 4.8–5.6)
HDLC SERPL-MCNC: 49 MG/DL (ref 40–60)
LDLC SERPL CALC-MCNC: 78 MG/DL (ref 0–100)
LDLC/HDLC SERPL: 1.52 {RATIO}
POTASSIUM SERPL-SCNC: 4.4 MMOL/L (ref 3.5–5.2)
PROT SERPL-MCNC: 6.8 G/DL (ref 6–8.5)
SODIUM SERPL-SCNC: 140 MMOL/L (ref 136–145)
TRIGL SERPL-MCNC: 127 MG/DL (ref 0–150)
VLDLC SERPL-MCNC: 22 MG/DL (ref 5–40)

## 2022-06-24 PROCEDURE — 83036 HEMOGLOBIN GLYCOSYLATED A1C: CPT

## 2022-06-24 PROCEDURE — 80053 COMPREHEN METABOLIC PANEL: CPT

## 2022-06-24 PROCEDURE — 36415 COLL VENOUS BLD VENIPUNCTURE: CPT

## 2022-06-24 PROCEDURE — 80061 LIPID PANEL: CPT

## 2022-07-05 DIAGNOSIS — E11.9 TYPE 2 DIABETES MELLITUS TREATED WITHOUT INSULIN: Primary | ICD-10-CM

## 2022-07-05 DIAGNOSIS — E78.00 HIGH CHOLESTEROL: ICD-10-CM

## 2022-07-11 ENCOUNTER — TELEPHONE (OUTPATIENT)
Dept: FAMILY MEDICINE CLINIC | Age: 67
End: 2022-07-11

## 2022-07-11 NOTE — TELEPHONE ENCOUNTER
Pt inf this is a common c/o and it should subside after a couple weeks. Also inf to stay hydrated.

## 2022-07-11 NOTE — TELEPHONE ENCOUNTER
Caller: Rivka Casillas    Relationship: Self    Best call back number: [phone]     What medications are you currently taking:   Current Outpatient Medications on File Prior to Visit   Medication Sig Dispense Refill   • ascorbic acid (VITAMIN C) 1000 MG tablet      • aspirin (aspirin) 81 MG EC tablet Take 1 tablet by mouth Daily.     • Blood Glucose Monitoring Suppl device Check blood sugar twice daily. 1 each 0   • Calcium Citrate 1040 MG tablet      • Cholecalciferol 50 MCG (2000 UT) capsule Vitamin D3 2,000 unit oral capsule take 1 capsule by oral route daily   Active     • fenofibrate (TRICOR) 145 MG tablet Take 1 tablet by mouth Daily for 90 days. 90 tablet 3   • glucose blood test strip 1 each by Other route 2 (Two) Times a Day. 200 each 3   • Lancets misc Check blood sugar twice daily. 200 each 3   • lisinopril-hydrochlorothiazide (PRINZIDE,ZESTORETIC) 20-25 MG per tablet Take 1 tablet by mouth Daily. 90 tablet 3   • meloxicam (MOBIC) 15 MG tablet meloxicam 15 mg oral tablet take 1 tablet (15 mg) by oral route once daily   Active     • metFORMIN (Glucophage) 500 MG tablet Take 1 tablet by mouth Daily With Breakfast. 90 tablet 1   • omeprazole (priLOSEC) 40 MG capsule Take 1 capsule by mouth.       No current facility-administered medications on file prior to visit.          When did you start taking these medications: July 6, 2022    Which medication are you concerned about: metFORMIN (Glucophage) 500 MG tablet    Who prescribed you this medication: DR. WALL    What are your concerns: HAS HAD DIARRHEA AND BELCHING SINCE TAKING THIS MEDICATION    How long have you had these concerns: July 6, 2022

## 2022-08-31 ENCOUNTER — OFFICE VISIT (OUTPATIENT)
Dept: FAMILY MEDICINE CLINIC | Age: 67
End: 2022-08-31

## 2022-08-31 VITALS
DIASTOLIC BLOOD PRESSURE: 47 MMHG | WEIGHT: 206 LBS | BODY MASS INDEX: 36.5 KG/M2 | HEIGHT: 63 IN | OXYGEN SATURATION: 97 % | HEART RATE: 80 BPM | SYSTOLIC BLOOD PRESSURE: 129 MMHG | RESPIRATION RATE: 17 BRPM

## 2022-08-31 DIAGNOSIS — I10 ESSENTIAL HYPERTENSION: ICD-10-CM

## 2022-08-31 DIAGNOSIS — E11.9 TYPE 2 DIABETES MELLITUS TREATED WITHOUT INSULIN: ICD-10-CM

## 2022-08-31 DIAGNOSIS — E78.00 HIGH CHOLESTEROL: ICD-10-CM

## 2022-08-31 DIAGNOSIS — M15.9 GENERALIZED OSTEOARTHRITIS: ICD-10-CM

## 2022-08-31 DIAGNOSIS — K21.9 GERD WITHOUT ESOPHAGITIS: Primary | ICD-10-CM

## 2022-08-31 PROBLEM — Z00.00 MEDICARE ANNUAL WELLNESS VISIT, SUBSEQUENT: Status: RESOLVED | Noted: 2022-02-28 | Resolved: 2022-08-31

## 2022-08-31 PROCEDURE — 99214 OFFICE O/P EST MOD 30 MIN: CPT | Performed by: FAMILY MEDICINE

## 2022-08-31 RX ORDER — LISINOPRIL AND HYDROCHLOROTHIAZIDE 25; 20 MG/1; MG/1
1 TABLET ORAL DAILY
Qty: 90 TABLET | Refills: 3 | Status: SHIPPED | OUTPATIENT
Start: 2022-08-31

## 2022-08-31 RX ORDER — FENOFIBRATE 145 MG/1
145 TABLET, COATED ORAL DAILY
Qty: 90 TABLET | Refills: 3 | Status: SHIPPED | OUTPATIENT
Start: 2022-08-31

## 2022-08-31 NOTE — ASSESSMENT & PLAN NOTE
Diabetes is worsening.   Continue current treatment regimen.  Reminded to bring in blood sugar diary at next visit.  Dietary recommendations for ADA diet.  Regular aerobic exercise.  Discussed foot care.  Diabetes will be reassessed in 6 months   NOT AT GOAL, WILL REPEAT LABS IN A FEW WEEKS.  .

## 2022-08-31 NOTE — PROGRESS NOTES
Chief Complaint  Hypertension, Diabetes, Hyperlipidemia, and Follow-up (6 month follow up)    Subjective          Rivka Casillas presents to [facility]  --TOLERATING BLOOD PRESSURE MEDICATION WITHOUT APPARENT SIDE EFFECTS  --LAST LIPIDS WERE OK, NO ISSUES WITH STATIN  --HGA1C WAS UP TO 7.8 %, STARTED ON METFORMIN.  A.M. SUGARS ARE AROUND 150  --DUE FOR A MAMMOGRAM  --GERD IS WELL-CONTROLLED WITH THE PPI  --ONLY TAKING THE MELOXICAM ON A PRN BASIS        No Known Allergies     Health Maintenance Due   Topic Date Due   • Pneumococcal Vaccine 65+ (1 - PCV) Never done   • ZOSTER VACCINE (1 of 2) Never done   • URINE MICROALBUMIN  02/28/2021   • DXA SCAN  03/04/2021   • DIABETIC FOOT EXAM  Never done        Current Outpatient Medications on File Prior to Visit   Medication Sig   • ascorbic acid (VITAMIN C) 1000 MG tablet    • aspirin 81 MG EC tablet Take 1 tablet by mouth Daily.   • Blood Glucose Monitoring Suppl device Check blood sugar twice daily.   • Calcium Citrate 1040 MG tablet    • Cholecalciferol 50 MCG (2000 UT) capsule Vitamin D3 2,000 unit oral capsule take 1 capsule by oral route daily   Active   • glucose blood test strip 1 each by Other route 2 (Two) Times a Day.   • Lancets misc Check blood sugar twice daily.   • meloxicam (MOBIC) 15 MG tablet 15 mg Daily As Needed.   • metFORMIN (Glucophage) 500 MG tablet Take 1 tablet by mouth Daily With Breakfast.   • omeprazole (priLOSEC) 40 MG capsule Take 1 capsule by mouth.   • [DISCONTINUED] lisinopril-hydrochlorothiazide (PRINZIDE,ZESTORETIC) 20-25 MG per tablet Take 1 tablet by mouth Daily.   • [DISCONTINUED] fenofibrate (TRICOR) 145 MG tablet Take 1 tablet by mouth Daily for 90 days.     No current facility-administered medications on file prior to visit.       Immunization History   Administered Date(s) Administered   • COVID-19 (PFIZER) PURPLE CAP 03/31/2021, 04/21/2021   • Covid-19 (Pfizer) Gray Cap 02/28/2022, 07/25/2022  "  • Hepatitis A 12/28/2018, 07/13/2019   • Influenza, Unspecified 02/12/2021   • Tdap 07/10/2014       Review of Systems   Constitutional: Negative for activity change, appetite change, chills, fatigue and fever.   HENT: Negative for congestion, ear pain, rhinorrhea and sore throat.    Respiratory: Negative for cough and shortness of breath.    Cardiovascular: Negative for chest pain, palpitations and leg swelling.   Gastrointestinal: Negative for abdominal pain, constipation, diarrhea, nausea and vomiting.   Musculoskeletal: Negative for arthralgias and myalgias.   Neurological: Negative for headache.        Objective     /47 (BP Location: Left arm, Patient Position: Sitting, Cuff Size: Large Adult)   Pulse 80   Resp 17   Ht 160 cm (63\")   Wt 93.4 kg (206 lb)   SpO2 97% Comment: Room air  BMI 36.49 kg/m²       Physical Exam  Vitals and nursing note reviewed.   Constitutional:       General: She is not in acute distress.     Appearance: Normal appearance.   Cardiovascular:      Rate and Rhythm: Normal rate and regular rhythm.      Pulses:           Dorsalis pedis pulses are 2+ on the right side and 2+ on the left side.        Posterior tibial pulses are 2+ on the right side and 2+ on the left side.      Heart sounds: Normal heart sounds. No murmur heard.  Pulmonary:      Effort: Pulmonary effort is normal.      Breath sounds: Normal breath sounds.   Abdominal:      Palpations: Abdomen is soft.      Tenderness: There is no abdominal tenderness.   Musculoskeletal:      Cervical back: Neck supple.      Right lower leg: No edema.      Left lower leg: No edema.      Right foot: Normal range of motion. No deformity, bunion, Charcot foot, foot drop or prominent metatarsal heads.      Left foot: Normal range of motion. No deformity, bunion, Charcot foot, foot drop or prominent metatarsal heads.   Feet:      Right foot:      Protective Sensation: 4 sites tested. 4 sites sensed.      Skin integrity: Skin integrity " normal.      Toenail Condition: Right toenails are normal.      Left foot:      Protective Sensation: 4 sites tested. 4 sites sensed.      Skin integrity: Skin integrity normal.      Toenail Condition: Left toenails are normal.      Comments:      Lymphadenopathy:      Cervical: No cervical adenopathy.   Neurological:      General: No focal deficit present.      Mental Status: She is alert.      Cranial Nerves: No cranial nerve deficit.      Coordination: Coordination normal.      Gait: Gait normal.   Psychiatric:         Mood and Affect: Mood normal.         Behavior: Behavior normal.         Result Review :                             Assessment and Plan      Diagnoses and all orders for this visit:    1. GERD without esophagitis (Primary)  Assessment & Plan:  IMPROVED WITH CURRENT TREATMENT, CONTINUE SAME, WILL REEVALUATE AT NEXT VISIT       2. Essential hypertension  Assessment & Plan:  Hypertension is improving with treatment.  Continue current treatment regimen.  Weight loss.  Regular aerobic exercise.  Continue current medications.  Blood pressure will be reassessed at the next regular appointment.    Orders:  -     lisinopril-hydrochlorothiazide (PRINZIDE,ZESTORETIC) 20-25 MG per tablet; Take 1 tablet by mouth Daily.  Dispense: 90 tablet; Refill: 3    3. High cholesterol  Assessment & Plan:  Lipid abnormalities are improving with treatment.  Nutritional counseling was provided.  Lipids will be reassessed in 6 months.    Orders:  -     fenofibrate (TRICOR) 145 MG tablet; Take 1 tablet by mouth Daily.  Dispense: 90 tablet; Refill: 3    4. Type 2 diabetes mellitus treated without insulin (HCC)  Assessment & Plan:  Diabetes is worsening.   Continue current treatment regimen.  Reminded to bring in blood sugar diary at next visit.  Dietary recommendations for ADA diet.  Regular aerobic exercise.  Discussed foot care.  Diabetes will be reassessed in 6 months   NOT AT GOAL, WILL REPEAT LABS IN A FEW WEEKS.   .      5. Generalized osteoarthritis  Assessment & Plan:  IMPROVED WITH CURRENT TREATMENT, CONTINUE SAME, WILL REEVALUATE AT NEXT VISIT             Follow Up     Return in about 6 months (around 2/28/2023).    Patient was given instructions and counseling regarding her condition or for health maintenance advice. Please see specific information pulled into the AVS if appropriate.       Answers for HPI/ROS submitted by the patient on 8/26/2022  Please describe your symptoms.: 6 month follow up visit. To check how I'm doing on the metformin and how my sugar is doing.  Have you had these symptoms before?: Yes  How long have you been having these symptoms?: Greater than 2 weeks  Please list any medications you are currently taking for this condition.: Metformin  Please describe any probable cause for these symptoms. : Diabetes  What is the primary reason for your visit?: Other

## 2022-10-10 ENCOUNTER — TELEPHONE (OUTPATIENT)
Dept: FAMILY MEDICINE CLINIC | Age: 67
End: 2022-10-10

## 2022-10-10 ENCOUNTER — LAB (OUTPATIENT)
Dept: LAB | Facility: HOSPITAL | Age: 67
End: 2022-10-10

## 2022-10-10 DIAGNOSIS — E78.00 HIGH CHOLESTEROL: ICD-10-CM

## 2022-10-10 DIAGNOSIS — Z12.31 SCREENING MAMMOGRAM FOR BREAST CANCER: Primary | ICD-10-CM

## 2022-10-10 DIAGNOSIS — E11.9 TYPE 2 DIABETES MELLITUS TREATED WITHOUT INSULIN: ICD-10-CM

## 2022-10-10 LAB
ALBUMIN SERPL-MCNC: 4.3 G/DL (ref 3.5–5.2)
ALBUMIN/GLOB SERPL: 2 G/DL
ALP SERPL-CCNC: 61 U/L (ref 39–117)
ALT SERPL W P-5'-P-CCNC: 22 U/L (ref 1–33)
ANION GAP SERPL CALCULATED.3IONS-SCNC: 8.1 MMOL/L (ref 5–15)
AST SERPL-CCNC: 15 U/L (ref 1–32)
BILIRUB SERPL-MCNC: 0.4 MG/DL (ref 0–1.2)
BUN SERPL-MCNC: 18 MG/DL (ref 8–23)
BUN/CREAT SERPL: 21.7 (ref 7–25)
CALCIUM SPEC-SCNC: 9.8 MG/DL (ref 8.6–10.5)
CHLORIDE SERPL-SCNC: 105 MMOL/L (ref 98–107)
CHOLEST SERPL-MCNC: 142 MG/DL (ref 0–200)
CO2 SERPL-SCNC: 29.9 MMOL/L (ref 22–29)
CREAT SERPL-MCNC: 0.83 MG/DL (ref 0.57–1)
EGFRCR SERPLBLD CKD-EPI 2021: 77.4 ML/MIN/1.73
GLOBULIN UR ELPH-MCNC: 2.2 GM/DL
GLUCOSE SERPL-MCNC: 166 MG/DL (ref 65–99)
HBA1C MFR BLD: 7.2 % (ref 4.8–5.6)
HDLC SERPL-MCNC: 55 MG/DL (ref 40–60)
LDLC SERPL CALC-MCNC: 75 MG/DL (ref 0–100)
LDLC/HDLC SERPL: 1.39 {RATIO}
POTASSIUM SERPL-SCNC: 4.4 MMOL/L (ref 3.5–5.2)
PROT SERPL-MCNC: 6.5 G/DL (ref 6–8.5)
SODIUM SERPL-SCNC: 143 MMOL/L (ref 136–145)
TRIGL SERPL-MCNC: 54 MG/DL (ref 0–150)
VLDLC SERPL-MCNC: 12 MG/DL (ref 5–40)

## 2022-10-10 PROCEDURE — 80053 COMPREHEN METABOLIC PANEL: CPT

## 2022-10-10 PROCEDURE — 83036 HEMOGLOBIN GLYCOSYLATED A1C: CPT

## 2022-10-10 PROCEDURE — 36415 COLL VENOUS BLD VENIPUNCTURE: CPT

## 2022-10-10 PROCEDURE — 80061 LIPID PANEL: CPT

## 2022-10-10 NOTE — TELEPHONE ENCOUNTER
Patient states she is needing her mammo for this year. She would like an order to be placed. Please advise.

## 2022-10-13 ENCOUNTER — HOSPITAL ENCOUNTER (OUTPATIENT)
Dept: MAMMOGRAPHY | Facility: HOSPITAL | Age: 67
Discharge: HOME | End: 2022-10-13
Admitting: FAMILY MEDICINE

## 2022-10-13 DIAGNOSIS — Z12.31 SCREENING MAMMOGRAM FOR BREAST CANCER: ICD-10-CM

## 2022-10-13 PROCEDURE — 77067 SCR MAMMO BI INCL CAD: CPT

## 2022-10-13 PROCEDURE — 77063 BREAST TOMOSYNTHESIS BI: CPT

## 2022-10-19 ENCOUNTER — TELEPHONE (OUTPATIENT)
Dept: FAMILY MEDICINE CLINIC | Age: 67
End: 2022-10-19

## 2022-10-19 DIAGNOSIS — E11.9 TYPE 2 DIABETES MELLITUS TREATED WITHOUT INSULIN: ICD-10-CM

## 2022-10-19 NOTE — TELEPHONE ENCOUNTER
Caller: Rivka Casillas    Relationship: Self    Best call back number: [phone]    Requested Prescriptions:   Requested Prescriptions     Pending Prescriptions Disp Refills   • metFORMIN (Glucophage) 500 MG tablet 90 tablet 1     Sig: Take 1 tablet by mouth Daily With Breakfast.        Pharmacy where request should be sent: [facility] - [address] [phone] [facility] [phone] FX         Does the patient have less than a 3 day supply:  [x] Yes  [] No    Delvin Whitt Rep   10/19/22 15:40 EDT

## 2023-01-17 DIAGNOSIS — E11.9 TYPE 2 DIABETES MELLITUS TREATED WITHOUT INSULIN: ICD-10-CM

## 2023-01-17 NOTE — TELEPHONE ENCOUNTER
Caller: Rivka Casillas    Relationship: Self    Best call back number: [phone]    Requested Prescriptions:   Requested Prescriptions     Pending Prescriptions Disp Refills   • metFORMIN (Glucophage) 500 MG tablet 90 tablet 1     Sig: Take 1 tablet by mouth Daily With Breakfast.        Pharmacy where request should be sent: [id number] - [facility] [address] [address] [address] AT [place] [address] & [address] [phone] [facility] [phone] FX     Additional details provided by patient: Completed out of medication.     Does the patient have less than a 3 day supply:  [x] Yes  [] No    Would you like a call back once the refill request has been completed: [x] Yes [] No    If the office needs to give you a call back, can they leave a voicemail: [x] Yes [] No    Delvin Puentes Rep   01/17/23 12:19 EST

## 2023-03-02 ENCOUNTER — LAB (OUTPATIENT)
Dept: LAB | Facility: HOSPITAL | Age: 68
End: 2023-03-02
Payer: MEDICARE

## 2023-03-02 ENCOUNTER — OFFICE VISIT (OUTPATIENT)
Dept: FAMILY MEDICINE CLINIC | Age: 68
End: 2023-03-02
Payer: MEDICARE

## 2023-03-02 VITALS
HEART RATE: 74 BPM | DIASTOLIC BLOOD PRESSURE: 84 MMHG | HEIGHT: 63 IN | BODY MASS INDEX: 36.64 KG/M2 | WEIGHT: 206.8 LBS | SYSTOLIC BLOOD PRESSURE: 130 MMHG

## 2023-03-02 DIAGNOSIS — Z00.00 MEDICARE ANNUAL WELLNESS VISIT, SUBSEQUENT: Primary | ICD-10-CM

## 2023-03-02 DIAGNOSIS — E11.9 TYPE 2 DIABETES MELLITUS TREATED WITHOUT INSULIN: ICD-10-CM

## 2023-03-02 DIAGNOSIS — I10 ESSENTIAL HYPERTENSION: ICD-10-CM

## 2023-03-02 DIAGNOSIS — Z23 NEED FOR PNEUMOCOCCAL VACCINATION: ICD-10-CM

## 2023-03-02 DIAGNOSIS — E78.00 HIGH CHOLESTEROL: ICD-10-CM

## 2023-03-02 DIAGNOSIS — K21.9 GERD WITHOUT ESOPHAGITIS: ICD-10-CM

## 2023-03-02 LAB
ALBUMIN SERPL-MCNC: 4.4 G/DL (ref 3.5–5.2)
ALBUMIN UR-MCNC: <1.2 MG/DL
ALBUMIN/GLOB SERPL: 1.6 G/DL
ALP SERPL-CCNC: 47 U/L (ref 39–117)
ALT SERPL W P-5'-P-CCNC: 25 U/L (ref 1–33)
AMORPH URATE CRY URNS QL MICRO: ABNORMAL /HPF
ANION GAP SERPL CALCULATED.3IONS-SCNC: 8.9 MMOL/L (ref 5–15)
AST SERPL-CCNC: 21 U/L (ref 1–32)
BACTERIA UR QL AUTO: ABNORMAL /HPF
BILIRUB SERPL-MCNC: 0.4 MG/DL (ref 0–1.2)
BILIRUB UR QL STRIP: NEGATIVE
BUN SERPL-MCNC: 17 MG/DL (ref 8–23)
BUN/CREAT SERPL: 16.2 (ref 7–25)
CALCIUM SPEC-SCNC: 10.4 MG/DL (ref 8.6–10.5)
CHLORIDE SERPL-SCNC: 104 MMOL/L (ref 98–107)
CHOLEST SERPL-MCNC: 162 MG/DL (ref 0–200)
CLARITY UR: ABNORMAL
CO2 SERPL-SCNC: 28.1 MMOL/L (ref 22–29)
COLOR UR: YELLOW
CREAT SERPL-MCNC: 1.05 MG/DL (ref 0.57–1)
CREAT UR-MCNC: 76.7 MG/DL
DEPRECATED RDW RBC AUTO: 39.8 FL (ref 37–54)
EGFRCR SERPLBLD CKD-EPI 2021: 58.4 ML/MIN/1.73
ERYTHROCYTE [DISTWIDTH] IN BLOOD BY AUTOMATED COUNT: 12.2 % (ref 12.3–15.4)
GLOBULIN UR ELPH-MCNC: 2.7 GM/DL
GLUCOSE SERPL-MCNC: 165 MG/DL (ref 65–99)
GLUCOSE UR STRIP-MCNC: NEGATIVE MG/DL
HBA1C MFR BLD: 7.3 % (ref 4.8–5.6)
HCT VFR BLD AUTO: 40.6 % (ref 34–46.6)
HDLC SERPL-MCNC: 61 MG/DL (ref 40–60)
HGB BLD-MCNC: 13.8 G/DL (ref 12–15.9)
HGB UR QL STRIP.AUTO: NEGATIVE
KETONES UR QL STRIP: NEGATIVE
LDLC SERPL CALC-MCNC: 84 MG/DL (ref 0–100)
LDLC/HDLC SERPL: 1.35 {RATIO}
LEUKOCYTE ESTERASE UR QL STRIP.AUTO: ABNORMAL
MCH RBC QN AUTO: 30.2 PG (ref 26.6–33)
MCHC RBC AUTO-ENTMCNC: 34 G/DL (ref 31.5–35.7)
MCV RBC AUTO: 88.8 FL (ref 79–97)
MICROALBUMIN/CREAT UR: NORMAL MG/G{CREAT}
NITRITE UR QL STRIP: NEGATIVE
PH UR STRIP.AUTO: 7 [PH] (ref 5–8)
PLATELET # BLD AUTO: 279 10*3/MM3 (ref 140–450)
PMV BLD AUTO: 9.7 FL (ref 6–12)
POTASSIUM SERPL-SCNC: 4.3 MMOL/L (ref 3.5–5.2)
PROT SERPL-MCNC: 7.1 G/DL (ref 6–8.5)
PROT UR QL STRIP: NEGATIVE
RBC # BLD AUTO: 4.57 10*6/MM3 (ref 3.77–5.28)
RBC # UR STRIP: ABNORMAL /HPF
REF LAB TEST METHOD: ABNORMAL
SODIUM SERPL-SCNC: 141 MMOL/L (ref 136–145)
SP GR UR STRIP: 1.02 (ref 1–1.03)
SQUAMOUS #/AREA URNS HPF: ABNORMAL /HPF
TRIGL SERPL-MCNC: 92 MG/DL (ref 0–150)
TSH SERPL DL<=0.05 MIU/L-ACNC: 1.76 UIU/ML (ref 0.27–4.2)
UROBILINOGEN UR QL STRIP: ABNORMAL
VLDLC SERPL-MCNC: 17 MG/DL (ref 5–40)
WBC # UR STRIP: ABNORMAL /HPF
WBC NRBC COR # BLD: 6.81 10*3/MM3 (ref 3.4–10.8)

## 2023-03-02 PROCEDURE — 85027 COMPLETE CBC AUTOMATED: CPT | Performed by: FAMILY MEDICINE

## 2023-03-02 PROCEDURE — 82043 UR ALBUMIN QUANTITATIVE: CPT | Performed by: FAMILY MEDICINE

## 2023-03-02 PROCEDURE — G0402 INITIAL PREVENTIVE EXAM: HCPCS | Performed by: FAMILY MEDICINE

## 2023-03-02 PROCEDURE — 90677 PCV20 VACCINE IM: CPT | Performed by: FAMILY MEDICINE

## 2023-03-02 PROCEDURE — 80061 LIPID PANEL: CPT | Performed by: FAMILY MEDICINE

## 2023-03-02 PROCEDURE — 84443 ASSAY THYROID STIM HORMONE: CPT | Performed by: FAMILY MEDICINE

## 2023-03-02 PROCEDURE — 1159F MED LIST DOCD IN RCRD: CPT | Performed by: FAMILY MEDICINE

## 2023-03-02 PROCEDURE — 81001 URINALYSIS AUTO W/SCOPE: CPT | Performed by: FAMILY MEDICINE

## 2023-03-02 PROCEDURE — 82570 ASSAY OF URINE CREATININE: CPT | Performed by: FAMILY MEDICINE

## 2023-03-02 PROCEDURE — 1170F FXNL STATUS ASSESSED: CPT | Performed by: FAMILY MEDICINE

## 2023-03-02 PROCEDURE — 83036 HEMOGLOBIN GLYCOSYLATED A1C: CPT | Performed by: FAMILY MEDICINE

## 2023-03-02 PROCEDURE — 36415 COLL VENOUS BLD VENIPUNCTURE: CPT | Performed by: FAMILY MEDICINE

## 2023-03-02 PROCEDURE — G0009 ADMIN PNEUMOCOCCAL VACCINE: HCPCS | Performed by: FAMILY MEDICINE

## 2023-03-02 PROCEDURE — 99214 OFFICE O/P EST MOD 30 MIN: CPT | Performed by: FAMILY MEDICINE

## 2023-03-02 PROCEDURE — 1126F AMNT PAIN NOTED NONE PRSNT: CPT | Performed by: FAMILY MEDICINE

## 2023-03-02 PROCEDURE — 80053 COMPREHEN METABOLIC PANEL: CPT | Performed by: FAMILY MEDICINE

## 2023-03-02 NOTE — PROGRESS NOTES
The ABCs of the Annual Wellness Visit  Subsequent Medicare Wellness Visit    Fide Casillas is a 67 y.o. female who presents for a Subsequent Medicare Wellness Visit.    The following portions of the patient's history were reviewed and   updated as appropriate: allergies, current medications, past family history, past medical history, past social history, past surgical history and problem list.    Compared to one year ago, the patient feels her physical   health is better.    Compared to one year ago, the patient feels her mental   health is the same.    Recent Hospitalizations:  She was not admitted to the hospital during the last year.       Current Medical Providers:  Patient Care Team:  Jono Lowe MD as PCP - General (Family Medicine)    Outpatient Medications Prior to Visit   Medication Sig Dispense Refill   • ascorbic acid (VITAMIN C) 1000 MG tablet      • aspirin 81 MG EC tablet Take 1 tablet by mouth Daily.     • Blood Glucose Monitoring Suppl device Check blood sugar twice daily. 1 each 0   • Calcium Citrate 1040 MG tablet      • Cholecalciferol 50 MCG (2000 UT) capsule Vitamin D3 2,000 unit oral capsule take 1 capsule by oral route daily   Active     • fenofibrate (TRICOR) 145 MG tablet Take 1 tablet by mouth Daily. 90 tablet 3   • glucose blood test strip 1 each by Other route 2 (Two) Times a Day. 200 each 3   • Lancets misc Check blood sugar twice daily. 200 each 3   • lisinopril-hydrochlorothiazide (PRINZIDE,ZESTORETIC) 20-25 MG per tablet Take 1 tablet by mouth Daily. 90 tablet 3   • meloxicam (MOBIC) 15 MG tablet 1 tablet Daily As Needed.     • metFORMIN (Glucophage) 500 MG tablet Take 1 tablet by mouth Daily With Breakfast. 90 tablet 1   • omeprazole (priLOSEC) 40 MG capsule Take 1 capsule by mouth.       No facility-administered medications prior to visit.       No opioid medication identified on active medication list. I have reviewed chart for other potential  high  "risk medication/s and harmful drug interactions in the elderly.          Aspirin is on active medication list. Aspirin use is indicated based on review of current medical condition/s. Pros and cons of this therapy have been discussed today. Benefits of this medication outweigh potential harm.  Patient has been encouraged to continue taking this medication.  .      Patient Active Problem List   Diagnosis   • Type 2 diabetes mellitus treated without insulin (HCC)   • Essential hypertension   • High cholesterol   • GERD without esophagitis   • Medicare annual wellness visit, subsequent   • Generalized osteoarthritis     Advance Care Planning  Advance Directive is not on file.  ACP discussion was held with the patient during this visit. Patient has an advance directive (not in EMR), copy requested.     Objective    Vitals:    03/02/23 0836   BP: 130/84   BP Location: Left arm   Patient Position: Sitting   Pulse: 74   Weight: 93.8 kg (206 lb 12.8 oz)   Height: 160 cm (63\")   PainSc: 0-No pain     Estimated body mass index is 36.63 kg/m² as calculated from the following:    Height as of this encounter: 160 cm (63\").    Weight as of this encounter: 93.8 kg (206 lb 12.8 oz).    Class 2 Severe Obesity (BMI >=35 and <=39.9). Obesity-related health conditions include the following: GERD. Obesity is unchanged. BMI is is above average; BMI management plan is completed. We discussed portion control and increasing exercise.      Does the patient have evidence of cognitive impairment? No          HEALTH RISK ASSESSMENT    Smoking Status:  Social History     Tobacco Use   Smoking Status Never   Smokeless Tobacco Never     Alcohol Consumption:  Social History     Substance and Sexual Activity   Alcohol Use Never     Fall Risk Screen:    STEADI Fall Risk Assessment was completed, and patient is at LOW risk for falls.Assessment completed on:3/2/2023    Depression Screening:  PHQ-2/PHQ-9 Depression Screening 3/2/2023   Little Interest " or Pleasure in Doing Things 0-->not at all   Feeling Down, Depressed or Hopeless 0-->not at all   PHQ-9: Brief Depression Severity Measure Score 0       Health Habits and Functional and Cognitive Screening:  Functional & Cognitive Status 3/2/2023   Do you have difficulty preparing food and eating? No   Do you have difficulty bathing yourself, getting dressed or grooming yourself? No   Do you have difficulty using the toilet? No   Do you have difficulty moving around from place to place? No   Do you have trouble with steps or getting out of a bed or a chair? No   Current Diet Well Balanced Diet   Dental Exam Up to date   Eye Exam Up to date   Exercise (times per week) 3 times per week   Current Exercises Include House Cleaning;Walking   Do you need help using the phone?  No   Are you deaf or do you have serious difficulty hearing?  No   Do you need help with transportation? No   Do you need help shopping? No   Do you need help preparing meals?  No   Do you need help with housework?  No   Do you need help with laundry? No   Do you need help taking your medications? No   Do you need help managing money? No   Do you ever drive or ride in a car without wearing a seat belt? No   Have you felt unusual stress, anger or loneliness in the last month? No   Who do you live with? Spouse   If you need help, do you have trouble finding someone available to you? No   Have you been bothered in the last four weeks by sexual problems? -   Do you have difficulty concentrating, remembering or making decisions? No       Age-appropriate Screening Schedule:  Refer to the list below for future screening recommendations based on patient's age, sex and/or medical conditions. Orders for these recommended tests are listed in the plan section. The patient has been provided with a written plan.    Health Maintenance   Topic Date Due   • URINE MICROALBUMIN  02/28/2021   • DXA SCAN  03/04/2021   • INFLUENZA VACCINE  08/01/2022   • COVID-19 Vaccine  (5 - Booster for Pfizer series) 09/19/2022   • DIABETIC FOOT EXAM  09/27/2022   • DIABETIC EYE EXAM  12/09/2022   • ZOSTER VACCINE (1 of 2) 03/02/2023 (Originally 7/30/2005)   • HEMOGLOBIN A1C  04/10/2023   • LIPID PANEL  10/10/2023   • ANNUAL WELLNESS VISIT  03/02/2024   • TDAP/TD VACCINES (2 - Td or Tdap) 07/10/2024   • MAMMOGRAM  10/13/2024   • COLORECTAL CANCER SCREENING  06/12/2028   • HEPATITIS C SCREENING  Completed   • Pneumococcal Vaccine 65+  Completed                CMS Preventative Services Quick Reference  Risk Factors Identified During Encounter  None Identified  The above risks/problems have been discussed with the patient.  Pertinent information has been shared with the patient in the After Visit Summary.  An After Visit Summary and PPPS were made available to the patient.    Follow Up:   Next Medicare Wellness visit to be scheduled in 1 year.       Additional E&M Note during same encounter follows:  Patient has multiple medical problems which are significant and separately identifiable that require additional work above and beyond the Medicare Wellness Visit.      Chief Complaint  Medicare Wellness-subsequent    Subjective        --TOLERATING BLOOD PRESSURE MEDICATION WITHOUT APPARENT SIDE EFFECTS  --LAST LIPIDS WERE OK, NO ISSUES WITH THE STATIN  --LAST HGA1C WAS DOWN TO 7.2 % ON CURRENT MEDS  --DUE FOR A PREVNAR-20 AND A SHINGRX    Rivkacheryl Casillas is also being seen today for SUGAR, BLOOD PRESSURE, GERD, CHOLESTEROL     Review of Systems   Constitutional: Negative for activity change, appetite change, chills, fatigue and fever.   HENT: Negative for congestion, ear pain, hearing loss, rhinorrhea and sore throat.    Eyes: Negative for discharge and visual disturbance.   Respiratory: Negative for cough and shortness of breath.    Cardiovascular: Negative for chest pain, palpitations and leg swelling.   Gastrointestinal: Negative for abdominal pain, diarrhea, nausea and vomiting.   Genitourinary:  "Negative for dysuria and hematuria.   Musculoskeletal: Negative for arthralgias and myalgias.   Psychiatric/Behavioral: Negative for dysphoric mood.       Objective   Vital Signs:  /84 (BP Location: Left arm, Patient Position: Sitting)   Pulse 74   Ht 160 cm (63\")   Wt 93.8 kg (206 lb 12.8 oz)   BMI 36.63 kg/m²     Physical Exam  Vitals and nursing note reviewed.   Constitutional:       General: She is not in acute distress.     Appearance: Normal appearance.   HENT:      Right Ear: Tympanic membrane normal.      Left Ear: Tympanic membrane normal.      Mouth/Throat:      Pharynx: Oropharynx is clear.   Eyes:      Conjunctiva/sclera: Conjunctivae normal.   Cardiovascular:      Rate and Rhythm: Normal rate and regular rhythm.      Heart sounds: Normal heart sounds. No murmur heard.  Pulmonary:      Effort: Pulmonary effort is normal.      Breath sounds: Normal breath sounds.   Abdominal:      General: Bowel sounds are normal.      Palpations: Abdomen is soft.      Tenderness: There is no abdominal tenderness.   Musculoskeletal:      Cervical back: Neck supple.      Right lower leg: No edema.      Left lower leg: No edema.   Lymphadenopathy:      Cervical: No cervical adenopathy.   Neurological:      General: No focal deficit present.      Mental Status: She is alert.      Cranial Nerves: No cranial nerve deficit.      Coordination: Coordination normal.      Gait: Gait normal.   Psychiatric:         Mood and Affect: Mood normal.         Behavior: Behavior normal.          The following data was reviewed by: Jono Lowe MD on 03/02/2023:  Common labs    Common Labs 6/24/22 6/24/22 6/24/22 10/10/22 10/10/22 10/10/22 3/2/23    0907 0907 0907 1026 1026 1026    Glucose  167 (A)   166 (A)     BUN  21   18     Creatinine  0.92   0.83     Sodium  140   143     Potassium  4.4   4.4     Chloride  102   105     Calcium  10.3   9.8     Albumin  4.40   4.30     Total Bilirubin  0.5   0.4     Alkaline " Phosphatase  50   61     AST (SGOT)  20   15     ALT (SGPT)  23   22     WBC       6.81   Hemoglobin       13.8   Hematocrit       40.6   Platelets       279   Total Cholesterol   149   142    Triglycerides   127   54    HDL Cholesterol   49   55    LDL Cholesterol    78   75    Hemoglobin A1C 7.80 (A)   7.20 (A)      (A) Abnormal value                       Assessment and Plan   Diagnoses and all orders for this visit:    1. Medicare annual wellness visit, subsequent (Primary)  Assessment & Plan:  ADVICE GIVEN RE:  SEATBELT USE, ALCOHOL USE, HEALTHY DIET, ROUTINE EYE AND DENTAL EXAM, ROUTINE VACCINATIONS.    WILL GIVE A PREVNAR-20 TODAY AND SHE WILL SPEAK TO HER PHARMACY ABOUT A SHINGRX       2. Essential hypertension  Assessment & Plan:  Hypertension is improving with treatment.  Continue current treatment regimen.  Regular aerobic exercise.  Continue current medications.  Blood pressure will be reassessed at the next regular appointment.    Orders:  -     CBC (No Diff)  -     Comprehensive Metabolic Panel  -     TSH Rfx On Abnormal To Free T4  -     Urinalysis With Culture If Indicated - Urine, Clean Catch  -     Urinalysis, Microscopic Only - Urine, Clean Catch    3. GERD without esophagitis  Assessment & Plan:  IMPROVED WITH CURRENT TREATMENT, CONTINUE SAME, WILL REEVALUATE AT NEXT VISIT       4. High cholesterol  Assessment & Plan:  Lipid abnormalities are improving with treatment.  Nutritional counseling was provided.  Lipids will be reassessed in 6 months.    Orders:  -     Lipid Panel    5. Type 2 diabetes mellitus treated without insulin (HCC)  Assessment & Plan:  Diabetes is improving with treatment.   Continue current treatment regimen.  Diabetes will be reassessed in 6 months   NOT AT GOAL BUT IMPROVED.    Orders:  -     Hemoglobin A1c  -     Microalbumin / Creatinine Urine Ratio - Urine, Clean Catch    6. Need for pneumococcal vaccination  -     Pneumococcal Conjugate Vaccine 20-Valent (PCV20)            Follow Up   Return in about 6 months (around 9/2/2023).  Patient was given instructions and counseling regarding her condition or for health maintenance advice. Please see specific information pulled into the AVS if appropriate.

## 2023-03-02 NOTE — ASSESSMENT & PLAN NOTE
Diabetes is improving with treatment.   Continue current treatment regimen.  Diabetes will be reassessed in 6 months   NOT AT GOAL BUT IMPROVED.

## 2023-03-02 NOTE — ASSESSMENT & PLAN NOTE
ADVICE GIVEN RE:  SEATBELT USE, ALCOHOL USE, HEALTHY DIET, ROUTINE EYE AND DENTAL EXAM, ROUTINE VACCINATIONS.    WILL GIVE A PREVNAR-20 TODAY AND SHE WILL SPEAK TO HER PHARMACY ABOUT A SHINGRX

## 2023-03-03 DIAGNOSIS — R73.9 ELEVATED BLOOD SUGAR: ICD-10-CM

## 2023-03-03 DIAGNOSIS — N28.9 ABNORMAL KIDNEY FUNCTION: Primary | ICD-10-CM

## 2023-05-30 ENCOUNTER — LAB (OUTPATIENT)
Dept: LAB | Facility: HOSPITAL | Age: 68
End: 2023-05-30

## 2023-05-30 DIAGNOSIS — R73.9 ELEVATED BLOOD SUGAR: ICD-10-CM

## 2023-05-30 DIAGNOSIS — N28.9 ABNORMAL KIDNEY FUNCTION: ICD-10-CM

## 2023-05-30 LAB — HBA1C MFR BLD: 7.5 % (ref 4.8–5.6)

## 2023-05-30 PROCEDURE — 83036 HEMOGLOBIN GLYCOSYLATED A1C: CPT

## 2023-05-30 PROCEDURE — 80053 COMPREHEN METABOLIC PANEL: CPT

## 2023-05-30 PROCEDURE — 36415 COLL VENOUS BLD VENIPUNCTURE: CPT

## 2023-05-31 DIAGNOSIS — E11.9 TYPE 2 DIABETES MELLITUS TREATED WITHOUT INSULIN: ICD-10-CM

## 2023-05-31 LAB
ALBUMIN SERPL-MCNC: 4 G/DL (ref 3.5–5.2)
ALBUMIN/GLOB SERPL: 1.4 G/DL
ALP SERPL-CCNC: 41 U/L (ref 39–117)
ALT SERPL W P-5'-P-CCNC: 29 U/L (ref 1–33)
ANION GAP SERPL CALCULATED.3IONS-SCNC: 9.8 MMOL/L (ref 5–15)
AST SERPL-CCNC: 18 U/L (ref 1–32)
BILIRUB SERPL-MCNC: 0.4 MG/DL (ref 0–1.2)
BUN SERPL-MCNC: 18 MG/DL (ref 8–23)
BUN/CREAT SERPL: 19.1 (ref 7–25)
CALCIUM SPEC-SCNC: 10.6 MG/DL (ref 8.6–10.5)
CHLORIDE SERPL-SCNC: 106 MMOL/L (ref 98–107)
CO2 SERPL-SCNC: 28.2 MMOL/L (ref 22–29)
CREAT SERPL-MCNC: 0.94 MG/DL (ref 0.57–1)
EGFRCR SERPLBLD CKD-EPI 2021: 66.6 ML/MIN/1.73
GLOBULIN UR ELPH-MCNC: 2.9 GM/DL
GLUCOSE SERPL-MCNC: 161 MG/DL (ref 65–99)
POTASSIUM SERPL-SCNC: 3.6 MMOL/L (ref 3.5–5.2)
PROT SERPL-MCNC: 6.9 G/DL (ref 6–8.5)
SODIUM SERPL-SCNC: 144 MMOL/L (ref 136–145)

## 2023-08-22 DIAGNOSIS — E78.00 HIGH CHOLESTEROL: ICD-10-CM

## 2023-08-22 RX ORDER — FENOFIBRATE 145 MG/1
TABLET, COATED ORAL
Qty: 90 TABLET | Refills: 3 | Status: SHIPPED | OUTPATIENT
Start: 2023-08-22

## 2023-08-28 DIAGNOSIS — I10 ESSENTIAL HYPERTENSION: ICD-10-CM

## 2023-08-28 RX ORDER — LISINOPRIL AND HYDROCHLOROTHIAZIDE 25; 20 MG/1; MG/1
1 TABLET ORAL DAILY
Qty: 90 TABLET | Refills: 1 | Status: SHIPPED | OUTPATIENT
Start: 2023-08-28

## 2023-09-07 ENCOUNTER — OFFICE VISIT (OUTPATIENT)
Dept: FAMILY MEDICINE CLINIC | Age: 68
End: 2023-09-07
Payer: MEDICARE

## 2023-09-07 ENCOUNTER — LAB (OUTPATIENT)
Dept: LAB | Facility: HOSPITAL | Age: 68
End: 2023-09-07
Payer: MEDICARE

## 2023-09-07 VITALS
BODY MASS INDEX: 36.79 KG/M2 | HEIGHT: 63 IN | SYSTOLIC BLOOD PRESSURE: 141 MMHG | WEIGHT: 207.6 LBS | HEART RATE: 82 BPM | DIASTOLIC BLOOD PRESSURE: 85 MMHG

## 2023-09-07 DIAGNOSIS — E78.00 HIGH CHOLESTEROL: ICD-10-CM

## 2023-09-07 DIAGNOSIS — E11.9 TYPE 2 DIABETES MELLITUS TREATED WITHOUT INSULIN: Primary | ICD-10-CM

## 2023-09-07 DIAGNOSIS — I10 ESSENTIAL HYPERTENSION: ICD-10-CM

## 2023-09-07 DIAGNOSIS — K21.9 GERD WITHOUT ESOPHAGITIS: ICD-10-CM

## 2023-09-07 DIAGNOSIS — Z12.31 ENCOUNTER FOR SCREENING MAMMOGRAM FOR BREAST CANCER: ICD-10-CM

## 2023-09-07 DIAGNOSIS — R23.2 HOT FLASHES: ICD-10-CM

## 2023-09-07 DIAGNOSIS — Z78.0 POSTMENOPAUSAL STATE: ICD-10-CM

## 2023-09-07 DIAGNOSIS — L60.8 DEFORMITY OF TOENAIL: ICD-10-CM

## 2023-09-07 PROBLEM — Z00.00 MEDICARE ANNUAL WELLNESS VISIT, SUBSEQUENT: Status: RESOLVED | Noted: 2022-02-28 | Resolved: 2023-09-07

## 2023-09-07 LAB
ALBUMIN SERPL-MCNC: 4.4 G/DL (ref 3.5–5.2)
ALBUMIN/GLOB SERPL: 1.8 G/DL
ALP SERPL-CCNC: 47 U/L (ref 39–117)
ALT SERPL W P-5'-P-CCNC: 23 U/L (ref 1–33)
ANION GAP SERPL CALCULATED.3IONS-SCNC: 8.9 MMOL/L (ref 5–15)
AST SERPL-CCNC: 18 U/L (ref 1–32)
BILIRUB SERPL-MCNC: 0.4 MG/DL (ref 0–1.2)
BUN SERPL-MCNC: 17 MG/DL (ref 8–23)
BUN/CREAT SERPL: 18.3 (ref 7–25)
CALCIUM SPEC-SCNC: 10.1 MG/DL (ref 8.6–10.5)
CHLORIDE SERPL-SCNC: 104 MMOL/L (ref 98–107)
CHOLEST SERPL-MCNC: 152 MG/DL (ref 0–200)
CO2 SERPL-SCNC: 29.1 MMOL/L (ref 22–29)
CREAT SERPL-MCNC: 0.93 MG/DL (ref 0.57–1)
DEPRECATED RDW RBC AUTO: 40.1 FL (ref 37–54)
EGFRCR SERPLBLD CKD-EPI 2021: 67.1 ML/MIN/1.73
ERYTHROCYTE [DISTWIDTH] IN BLOOD BY AUTOMATED COUNT: 12.2 % (ref 12.3–15.4)
GLOBULIN UR ELPH-MCNC: 2.4 GM/DL
GLUCOSE SERPL-MCNC: 172 MG/DL (ref 65–99)
HBA1C MFR BLD: 7.4 % (ref 4.8–5.6)
HCT VFR BLD AUTO: 41.5 % (ref 34–46.6)
HDLC SERPL-MCNC: 55 MG/DL (ref 40–60)
HGB BLD-MCNC: 13.9 G/DL (ref 12–15.9)
LDLC SERPL CALC-MCNC: 78 MG/DL (ref 0–100)
LDLC/HDLC SERPL: 1.39 {RATIO}
MCH RBC QN AUTO: 30 PG (ref 26.6–33)
MCHC RBC AUTO-ENTMCNC: 33.5 G/DL (ref 31.5–35.7)
MCV RBC AUTO: 89.4 FL (ref 79–97)
PLATELET # BLD AUTO: 270 10*3/MM3 (ref 140–450)
PMV BLD AUTO: 9.1 FL (ref 6–12)
POTASSIUM SERPL-SCNC: 5.1 MMOL/L (ref 3.5–5.2)
PROT SERPL-MCNC: 6.8 G/DL (ref 6–8.5)
RBC # BLD AUTO: 4.64 10*6/MM3 (ref 3.77–5.28)
SODIUM SERPL-SCNC: 142 MMOL/L (ref 136–145)
TRIGL SERPL-MCNC: 102 MG/DL (ref 0–150)
TSH SERPL DL<=0.05 MIU/L-ACNC: 2.31 UIU/ML (ref 0.27–4.2)
VLDLC SERPL-MCNC: 19 MG/DL (ref 5–40)
WBC NRBC COR # BLD: 6.27 10*3/MM3 (ref 3.4–10.8)

## 2023-09-07 PROCEDURE — 36415 COLL VENOUS BLD VENIPUNCTURE: CPT | Performed by: FAMILY MEDICINE

## 2023-09-07 PROCEDURE — 83036 HEMOGLOBIN GLYCOSYLATED A1C: CPT | Performed by: FAMILY MEDICINE

## 2023-09-07 PROCEDURE — 85027 COMPLETE CBC AUTOMATED: CPT | Performed by: FAMILY MEDICINE

## 2023-09-07 PROCEDURE — 80061 LIPID PANEL: CPT | Performed by: FAMILY MEDICINE

## 2023-09-07 PROCEDURE — 84443 ASSAY THYROID STIM HORMONE: CPT | Performed by: FAMILY MEDICINE

## 2023-09-07 PROCEDURE — 80053 COMPREHEN METABOLIC PANEL: CPT | Performed by: FAMILY MEDICINE

## 2023-09-07 NOTE — PROGRESS NOTES
Chief Complaint  Heartburn (6 months)    Subjective          Rivka Casillas presents to [facility]  History of Present Illness  --TOLERATING BLOOD PRESSURE MEDICATION WITHOUT APPARENT SIDE EFFECTS  --LAST HGA1C WAS 7.5 %, METFORMIN WAS INCREASED, TOLERATING WELL, DUE FOR A FOOT EXAM  --LAST LIPIDS WERE OK, NO ISSUES WITH MEDS  --GERD IS WELL-CONTROLLED WITH THE PPI  --OCCASIONAL BRIEF HOT FLASHES, SIMILAR TO WHAT SHE HAD DURING MENOPAUSE, HAS NOT CHECKED SUGAR DURING THESE TIMES      No Known Allergies     Health Maintenance Due   Topic Date Due    DXA SCAN  03/04/2021    COVID-19 Vaccine (5 - Pfizer series) 09/19/2022    DIABETIC FOOT EXAM  09/27/2022        Current Outpatient Medications on File Prior to Visit   Medication Sig    ascorbic acid (VITAMIN C) 1000 MG tablet     aspirin 81 MG EC tablet Take 1 tablet by mouth Daily.    Blood Glucose Monitoring Suppl device Check blood sugar twice daily.    Calcium Citrate 1040 MG tablet     Cholecalciferol 50 MCG (2000 UT) capsule Vitamin D3 2,000 unit oral capsule take 1 capsule by oral route daily   Active    fenofibrate (TRICOR) 145 MG tablet TAKE 1 TABLET DAILY    glucose blood test strip 1 each by Other route 2 (Two) Times a Day.    Lancets misc Check blood sugar twice daily.    lisinopril-hydrochlorothiazide (PRINZIDE,ZESTORETIC) 20-25 MG per tablet Take 1 tablet by mouth Daily.    meloxicam (MOBIC) 15 MG tablet 1 tablet Daily As Needed.    metFORMIN (Glucophage) 500 MG tablet Take 1 tablet by mouth 2 (Two) Times a Day With Meals.    omeprazole (priLOSEC) 40 MG capsule Take 1 capsule by mouth.     No current facility-administered medications on file prior to visit.       Immunization History   Administered Date(s) Administered    COVID-19 (PFIZER) Purple Cap Monovalent 03/31/2021, 04/21/2021    Covid-19 (Pfizer) Gray Cap Monovalent 02/28/2022, 07/25/2022    Hepatitis A 12/28/2018, 07/13/2019    Influenza, Unspecified 02/12/2021  "   Pneumococcal Conjugate 20-Valent (PCV20) 03/02/2023    Tdap 07/10/2014       Review of Systems   Constitutional:  Negative for activity change, appetite change, chills, fatigue and fever.   HENT:  Negative for congestion, ear pain, rhinorrhea and sore throat.    Respiratory:  Negative for cough and shortness of breath.    Cardiovascular:  Negative for chest pain, palpitations and leg swelling.   Gastrointestinal:  Negative for abdominal pain, constipation, diarrhea, nausea and vomiting.   Musculoskeletal:  Negative for arthralgias and myalgias.   Neurological:  Negative for headache.      Objective     /85 (BP Location: Left arm, Patient Position: Sitting)   Pulse 82   Ht 160 cm (63\")   Wt 94.2 kg (207 lb 9.6 oz)   BMI 36.77 kg/m²       Physical Exam  Vitals and nursing note reviewed.   Constitutional:       General: She is not in acute distress.     Appearance: Normal appearance.   Cardiovascular:      Rate and Rhythm: Normal rate and regular rhythm.      Pulses:           Dorsalis pedis pulses are 2+ on the right side and 2+ on the left side.        Posterior tibial pulses are 2+ on the right side and 2+ on the left side.      Heart sounds: Normal heart sounds. No murmur heard.  Pulmonary:      Effort: Pulmonary effort is normal.      Breath sounds: Normal breath sounds.   Abdominal:      Palpations: Abdomen is soft.      Tenderness: There is no abdominal tenderness.   Musculoskeletal:      Cervical back: Neck supple.      Right lower leg: No edema.      Left lower leg: No edema.      Right foot: Normal range of motion. No deformity, bunion, Charcot foot, foot drop or prominent metatarsal heads.      Left foot: Normal range of motion. No deformity, bunion, Charcot foot, foot drop or prominent metatarsal heads.   Feet:      Right foot:      Protective Sensation: 4 sites tested.  4 sites sensed.      Skin integrity: Skin integrity normal.      Toenail Condition: Right toenails are abnormally thick.      " Left foot:      Protective Sensation: 4 sites tested.  4 sites sensed.      Skin integrity: Skin integrity normal.      Toenail Condition: Left toenails are normal.      Comments:    LEFT GREAT TOENAIL WITH A THICK HUMP IN THE MIDDLE   Lymphadenopathy:      Cervical: No cervical adenopathy.   Neurological:      General: No focal deficit present.      Mental Status: She is alert.      Cranial Nerves: No cranial nerve deficit.      Coordination: Coordination normal.      Gait: Gait normal.   Psychiatric:         Mood and Affect: Mood normal.         Behavior: Behavior normal.       Result Review :                             Assessment and Plan      Diagnoses and all orders for this visit:    1. Type 2 diabetes mellitus treated without insulin (Primary)  Assessment & Plan:  Diabetes is improving with treatment.   Continue current treatment regimen.  Diabetes will be reassessed in 6 months  NOT AT GOAL, REPEAT LABS AS NOTED .    Orders:  -     Hemoglobin A1c    2. Essential hypertension  Assessment & Plan:  Hypertension is improving with treatment.  Continue current treatment regimen.  Blood pressure will be reassessed at the next regular appointment.    Orders:  -     CBC (No Diff)  -     Comprehensive Metabolic Panel  -     TSH Rfx On Abnormal To Free T4    3. High cholesterol  Assessment & Plan:  Lipid abnormalities are improving with treatment.  Nutritional counseling was provided.  Lipids will be reassessed in 6 months.    Orders:  -     Lipid Panel    4. GERD without esophagitis  Assessment & Plan:  IMPROVED WITH CURRENT TREATMENT, CONTINUE SAME, WILL REEVALUATE AT NEXT VISIT       5. Postmenopausal state  -     DEXA Bone Density Axial; Future    6. Encounter for screening mammogram for breast cancer  -     Mammo Screening Digital Tomosynthesis Bilateral With CAD; Future    7. Deformity of toenail  Comments:  POSSIBLE NAIL BED INJURY COJPLICATED BY FUNGAL ELEMENT  Orders:  -     Ambulatory Referral to  Podiatry    8. Hot flashes  Assessment & Plan:  NONSPECIFIC, POSSIBLE POSTMENOPAUSAL HOT FLASH, POSSIBLE LOW SUGAR.  ADVISE DRINKING SOME O.J. WITH THE SYMPTOMS AND ALSO CHECKING HER SUGAR.  LABS AS NOTED, CONSIDER FURTHER W/U.               Follow Up     Return in about 6 months (around 3/7/2024).    Patient was given instructions and counseling regarding her condition or for health maintenance advice. Please see specific information pulled into the AVS if appropriate.

## 2023-09-07 NOTE — ASSESSMENT & PLAN NOTE
NONSPECIFIC, POSSIBLE POSTMENOPAUSAL HOT FLASH, POSSIBLE LOW SUGAR.  ADVISE DRINKING SOME O.J. WITH THE SYMPTOMS AND ALSO CHECKING HER SUGAR.  LABS AS NOTED, CONSIDER FURTHER W/U.

## 2023-09-07 NOTE — ASSESSMENT & PLAN NOTE
Diabetes is improving with treatment.   Continue current treatment regimen.  Diabetes will be reassessed in 6 months  NOT AT GOAL, REPEAT LABS AS NOTED .

## 2023-09-11 DIAGNOSIS — E11.9 TYPE 2 DIABETES MELLITUS TREATED WITHOUT INSULIN: Primary | ICD-10-CM

## 2023-10-19 ENCOUNTER — OFFICE VISIT (OUTPATIENT)
Dept: PODIATRY | Facility: CLINIC | Age: 68
End: 2023-10-19
Payer: MEDICARE

## 2023-10-19 VITALS
WEIGHT: 209 LBS | BODY MASS INDEX: 37.03 KG/M2 | OXYGEN SATURATION: 97 % | DIASTOLIC BLOOD PRESSURE: 74 MMHG | SYSTOLIC BLOOD PRESSURE: 149 MMHG | HEIGHT: 63 IN | HEART RATE: 87 BPM

## 2023-10-19 DIAGNOSIS — B35.1 ONYCHOMYCOSIS: Primary | ICD-10-CM

## 2023-10-19 DIAGNOSIS — L60.0 INGROWN NAIL: ICD-10-CM

## 2023-10-19 DIAGNOSIS — Z79.4 TYPE 2 DIABETES MELLITUS WITH HYPERGLYCEMIA, WITH LONG-TERM CURRENT USE OF INSULIN: ICD-10-CM

## 2023-10-19 DIAGNOSIS — E11.65 TYPE 2 DIABETES MELLITUS WITH HYPERGLYCEMIA, WITH LONG-TERM CURRENT USE OF INSULIN: ICD-10-CM

## 2023-10-19 NOTE — PROGRESS NOTES
[facility] - PODIATRY    Today's Date: 10/20/23    Patient Name: Rivka Casillas  MRN: [id number]  CSN: [id number]  PCP: Jono Lowe MD,   Referring Provider: Jono Lowe*    SUBJECTIVE     Chief Complaint   Patient presents with    Left Foot - Establish Care, Diabetes, Nail Problem     Left great toenail the worst, discolored   Recent blood sugar 177, this morning     Right Foot - Establish Care, Diabetes, Nail Problem     HPI: Rivka Casillas, a 68 y.o.female, presents to clinic.  Patient presents with left great toenail discoloration.  Patient is diabetic and her last blood sugar was 177.  Patient states it does not give her pain.  She believes she injured it at some point in time and has gotten loose.  She is here for further treatment.    Past Medical History:   Diagnosis Date    Allergies     Cataract 2022    had cataract surgery and had my distance vision corrected    Diet-controlled type 2 diabetes mellitus     Diverticulosis     Essential (primary) hypertension     Gastro-esophageal reflux disease without esophagitis     Hereditary and idiopathic neuropathy, unspecified     Personal history of colonic polyps     Psoriasis     Pure hyperglyceridemia     Vitamin D deficiency, unspecified      Past Surgical History:   Procedure Laterality Date    CATARACT EXTRACTION Bilateral      SECTION      X1    COLONOSCOPY  2018    NORMAL    DILATATION AND CURETTAGE      TONSILLECTOMY AND ADENOIDECTOMY      TUBAL ABDOMINAL LIGATION Bilateral      Family History   Problem Relation Age of Onset    Breast cancer Other     Diabetes type II Other     Diabetes Maternal Grandmother     Diabetes Sister     Vision loss Brother      Social History     Socioeconomic History    Marital status:    Tobacco Use    Smoking status: Never    Smokeless tobacco: Never   Vaping Use    Vaping Use: Never used   Substance and Sexual Activity    Alcohol use: Never    Drug use:  Never    Sexual activity: Yes     Partners: Male     Birth control/protection: Surgical, Post-menopausal     No Known Allergies  Current Outpatient Medications   Medication Sig Dispense Refill    ascorbic acid (VITAMIN C) 1000 MG tablet       aspirin 81 MG EC tablet Take 1 tablet by mouth Daily.      Blood Glucose Monitoring Suppl device Check blood sugar twice daily. 1 each 0    Calcium Citrate 1040 MG tablet       Cholecalciferol 50 MCG (2000 UT) capsule Vitamin D3 2,000 unit oral capsule take 1 capsule by oral route daily   Active      fenofibrate (TRICOR) 145 MG tablet TAKE 1 TABLET DAILY 90 tablet 3    glucose blood test strip 1 each by Other route 2 (Two) Times a Day. 200 each 3    Lancets misc Check blood sugar twice daily. 200 each 3    lisinopril-hydrochlorothiazide (PRINZIDE,ZESTORETIC) 20-25 MG per tablet Take 1 tablet by mouth Daily. 90 tablet 1    meloxicam (MOBIC) 15 MG tablet 1 tablet Daily As Needed.      metFORMIN (Glucophage) 500 MG tablet Take 1 tablet by mouth 2 (Two) Times a Day With Meals. 180 tablet 1    omeprazole (priLOSEC) 40 MG capsule Take 1 capsule by mouth.       No current facility-administered medications for this visit.     Review of Systems   Constitutional: Negative.    HENT: Negative.     Eyes: Negative.    Respiratory: Negative.     Cardiovascular: Negative.    Gastrointestinal: Negative.    Endocrine: Negative.    Genitourinary: Negative.    Musculoskeletal: Negative.    Skin: Negative.         Painful toenails.   Allergic/Immunologic: Negative.    Neurological: Negative.    Hematological: Negative.    Psychiatric/Behavioral: Negative.     All other systems reviewed and are negative.      OBJECTIVE     Vitals:    10/19/23 1101   BP: 149/74   Pulse: 87   SpO2: 97%       WBC   Date Value Ref Range Status   09/07/2023 6.27 3.40 - 10.80 10*3/mm3 Final   02/12/2021 6.92 4.80 - 10.80 10*3/uL Final     RBC   Date Value Ref Range Status   09/07/2023 4.64 3.77 - 5.28 10*6/mm3 Final      Hemoglobin   Date Value Ref Range Status   09/07/2023 13.9 12.0 - 15.9 g/dL Final     Hematocrit   Date Value Ref Range Status   09/07/2023 41.5 34.0 - 46.6 % Final     MCV   Date Value Ref Range Status   09/07/2023 89.4 79.0 - 97.0 fL Final     MCH   Date Value Ref Range Status   09/07/2023 30.0 26.6 - 33.0 pg Final     MCHC   Date Value Ref Range Status   09/07/2023 33.5 31.5 - 35.7 g/dL Final     RDW   Date Value Ref Range Status   09/07/2023 12.2 (L) 12.3 - 15.4 % Final     RDW-SD   Date Value Ref Range Status   09/07/2023 40.1 37.0 - 54.0 fl Final     MPV   Date Value Ref Range Status   09/07/2023 9.1 6.0 - 12.0 fL Final     Platelets   Date Value Ref Range Status   09/07/2023 270 140 - 450 10*3/mm3 Final     Neutrophil Rel %   Date Value Ref Range Status   05/24/2019 49.7 30.0 - 85.0 % Final     Monocyte Rel %   Date Value Ref Range Status   05/24/2019 7.10 3.00 - 10.00 % Final     Eosinophil Rel %   Date Value Ref Range Status   05/24/2019 2.30 0.00 - 7.00 % Final     Basophil Rel %   Date Value Ref Range Status   05/24/2019 0.80 0.00 - 3.00 % Final     Neutrophils Absolute   Date Value Ref Range Status   05/24/2019 3.30 2.00 - 8.00 10*3/uL Final     Lymphocytes Absolute   Date Value Ref Range Status   05/24/2019 2.64 1.00 - 5.00 10*3/uL Final     Monocytes Absolute   Date Value Ref Range Status   09/06/2019 0.54 0.20 - 1.20 10*3/uL Final     Eosinophils Absolute   Date Value Ref Range Status   09/06/2019 0.20 0.00 - 0.70 10*3/uL Final     Basophils Absolute   Date Value Ref Range Status   09/06/2019 0.03 0.00 - 0.20 10*3/uL Final     NRBC   Date Value Ref Range Status   05/24/2019 0.00 0.00 - 0.70 % Final         Lab Results   Component Value Date    GLUCOSE 172 (H) 09/07/2023    BUN 17 09/07/2023    CREATININE 0.93 09/07/2023    EGFRIFNONA 67 08/27/2021    BCR 18.3 09/07/2023    K 5.1 09/07/2023    CO2 29.1 (H) 09/07/2023    CALCIUM 10.1 09/07/2023    ALBUMIN 4.4 09/07/2023    LABIL2 1.6 02/12/2021     AST 18 09/07/2023    ALT 23 09/07/2023       Patient seen in no apparent distress.      PHYSICAL EXAM:     Foot/Ankle Exam    GENERAL  Appearance:  appears stated age  Orientation:  AAOx3  Affect:  appropriate  Gait:  unimpaired  Assistance:  independent  Right shoe gear: casual shoe  Left shoe gear: casual shoe    VASCULAR     Right Foot Vascularity   Normal vascular exam    Dorsalis pedis:  2+  Posterior tibial:  2+  Skin temperature:  warm  Edema grading:  None  CFT:  < 3 seconds  Pedal hair growth:  Present  Varicosities:  none     Left Foot Vascularity   Normal vascular exam    Dorsalis pedis:  2+  Posterior tibial:  2+  Skin temperature:  warm  Edema grading:  None  CFT:  < 3 seconds  Pedal hair growth:  Present  Varicosities:  none     NEUROLOGIC     Right Foot Neurologic   Normal sensation    Light touch sensation: normal  Vibratory sensation: normal  Hot/Cold sensation: normal  Protective Sensation using Clay-Judy Monofilament:   Sites intact: 10  Sites tested: 10     Left Foot Neurologic   Normal sensation    Light touch sensation: normal  Vibratory sensation: normal  Hot/Cold sensation:  normal  Protective Sensation using Clay-Judy Monofilament:   Sites intact: 10  Sites tested: 10    MUSCLE STRENGTH     Right Foot Muscle Strength   Foot dorsiflexion:  4  Foot plantar flexion:  4  Foot inversion:  4  Foot eversion:  4     Left Foot Muscle Strength   Foot dorsiflexion:  4  Foot plantar flexion:  4  Foot inversion:  4  Foot eversion:  4    RANGE OF MOTION     Right Foot Range of Motion   Foot and ankle ROM within normal limits       Left Foot Range of Motion   Foot and ankle ROM within normal limits      DERMATOLOGIC      Right Foot Dermatologic   Skin  Right foot skin is intact.      Left Foot Dermatologic   Skin  Left foot skin is intact.   Nails  1.  Positive for elongated, onychomycosis, abnormal thickness and subungual debris.      RADIOLOGY:        No results found.    ASSESSMENT/PLAN      Diagnoses and all orders for this visit:    1. Onychomycosis (Primary)    2. Ingrown nail    3. Type 2 diabetes mellitus with hyperglycemia, with long-term current use of insulin      Comprehensive lower extremity examination and evaluation was performed.    Discussed findings and treatment plan including risks, benefits, and treatment options with patient in detail. Patient agreed with treatment plan.    Medications and allergies reviewed.  Reviewed available lab values along with other pertinent labs.  These were discussed with the patient.    An After Visit Summary was printed and given to the patient at discharge, including (if requested) any available informative/educational handouts regarding diagnosis, treatment, or medications. All questions were answered to patient/family satisfaction. Should symptoms fail to improve or worsen they agree to call or return to clinic or to go to the Emergency Department. Discussed the importance of following up with any needed screening tests/labs/specialist appointments and any requested follow-up recommended by me today. Importance of maintaining follow-up discussed and patient accepts that missed appointments can delay diagnosis and potentially lead to worsening of conditions.    Return in about 1 month (around 11/19/2023)., or sooner if acute issues arise.    This document has been electronically signed by Santo Hernandez DPM on October 20, 2023 07:41 EDT

## 2023-10-26 ENCOUNTER — HOSPITAL ENCOUNTER (OUTPATIENT)
Dept: MAMMOGRAPHY | Facility: HOSPITAL | Age: 68
Discharge: HOME | End: 2023-10-26
Payer: MEDICARE

## 2023-10-26 ENCOUNTER — HOSPITAL ENCOUNTER (OUTPATIENT)
Dept: BONE DENSITY | Facility: HOSPITAL | Age: 68
Discharge: HOME | End: 2023-10-26
Payer: MEDICARE

## 2023-10-26 DIAGNOSIS — Z78.0 POSTMENOPAUSAL STATE: ICD-10-CM

## 2023-10-26 DIAGNOSIS — Z12.31 ENCOUNTER FOR SCREENING MAMMOGRAM FOR BREAST CANCER: ICD-10-CM

## 2023-10-26 PROCEDURE — 77080 DXA BONE DENSITY AXIAL: CPT

## 2023-10-26 PROCEDURE — 77067 SCR MAMMO BI INCL CAD: CPT

## 2023-10-26 PROCEDURE — 77063 BREAST TOMOSYNTHESIS BI: CPT

## 2023-10-27 DIAGNOSIS — E11.9 TYPE 2 DIABETES MELLITUS TREATED WITHOUT INSULIN: ICD-10-CM

## 2023-10-27 DIAGNOSIS — E78.00 HIGH CHOLESTEROL: ICD-10-CM

## 2023-10-27 RX ORDER — FENOFIBRATE 145 MG/1
145 TABLET, COATED ORAL DAILY
Qty: 90 TABLET | Refills: 1 | Status: SHIPPED | OUTPATIENT
Start: 2023-10-27

## 2023-12-14 ENCOUNTER — LAB (OUTPATIENT)
Dept: LAB | Facility: HOSPITAL | Age: 68
End: 2023-12-14
Payer: MEDICARE

## 2023-12-14 DIAGNOSIS — E11.9 TYPE 2 DIABETES MELLITUS TREATED WITHOUT INSULIN: ICD-10-CM

## 2023-12-14 LAB
ALBUMIN SERPL-MCNC: 4.2 G/DL (ref 3.5–5.2)
ALBUMIN/GLOB SERPL: 1.7 G/DL
ALP SERPL-CCNC: 53 U/L (ref 39–117)
ALT SERPL W P-5'-P-CCNC: 20 U/L (ref 1–33)
ANION GAP SERPL CALCULATED.3IONS-SCNC: 10.4 MMOL/L (ref 5–15)
AST SERPL-CCNC: 16 U/L (ref 1–32)
BILIRUB SERPL-MCNC: 0.5 MG/DL (ref 0–1.2)
BUN SERPL-MCNC: 15 MG/DL (ref 8–23)
BUN/CREAT SERPL: 18.8 (ref 7–25)
CALCIUM SPEC-SCNC: 9.5 MG/DL (ref 8.6–10.5)
CHLORIDE SERPL-SCNC: 103 MMOL/L (ref 98–107)
CO2 SERPL-SCNC: 28.6 MMOL/L (ref 22–29)
CREAT SERPL-MCNC: 0.8 MG/DL (ref 0.57–1)
EGFRCR SERPLBLD CKD-EPI 2021: 80.4 ML/MIN/1.73
GLOBULIN UR ELPH-MCNC: 2.5 GM/DL
GLUCOSE SERPL-MCNC: 173 MG/DL (ref 65–99)
HBA1C MFR BLD: 7.4 % (ref 4.8–5.6)
POTASSIUM SERPL-SCNC: 3.6 MMOL/L (ref 3.5–5.2)
PROT SERPL-MCNC: 6.7 G/DL (ref 6–8.5)
SODIUM SERPL-SCNC: 142 MMOL/L (ref 136–145)

## 2023-12-14 PROCEDURE — 80053 COMPREHEN METABOLIC PANEL: CPT

## 2023-12-14 PROCEDURE — 83036 HEMOGLOBIN GLYCOSYLATED A1C: CPT

## 2023-12-14 PROCEDURE — 36415 COLL VENOUS BLD VENIPUNCTURE: CPT

## 2023-12-31 DIAGNOSIS — E11.9 TYPE 2 DIABETES MELLITUS TREATED WITHOUT INSULIN: ICD-10-CM

## 2024-02-22 DIAGNOSIS — I10 ESSENTIAL HYPERTENSION: ICD-10-CM

## 2024-02-22 RX ORDER — LISINOPRIL AND HYDROCHLOROTHIAZIDE 25; 20 MG/1; MG/1
1 TABLET ORAL DAILY
Qty: 90 TABLET | Refills: 1 | Status: SHIPPED | OUTPATIENT
Start: 2024-02-22

## 2024-03-07 ENCOUNTER — LAB (OUTPATIENT)
Dept: LAB | Facility: HOSPITAL | Age: 69
End: 2024-03-07
Payer: MEDICARE

## 2024-03-07 ENCOUNTER — OFFICE VISIT (OUTPATIENT)
Dept: FAMILY MEDICINE CLINIC | Age: 69
End: 2024-03-07
Payer: MEDICARE

## 2024-03-07 VITALS
OXYGEN SATURATION: 95 % | BODY MASS INDEX: 36.04 KG/M2 | HEART RATE: 81 BPM | SYSTOLIC BLOOD PRESSURE: 137 MMHG | HEIGHT: 63 IN | WEIGHT: 203.4 LBS | TEMPERATURE: 98.8 F | DIASTOLIC BLOOD PRESSURE: 85 MMHG

## 2024-03-07 DIAGNOSIS — Z00.00 MEDICARE ANNUAL WELLNESS VISIT, SUBSEQUENT: Primary | ICD-10-CM

## 2024-03-07 DIAGNOSIS — E78.00 HIGH CHOLESTEROL: ICD-10-CM

## 2024-03-07 DIAGNOSIS — K21.9 GERD WITHOUT ESOPHAGITIS: ICD-10-CM

## 2024-03-07 DIAGNOSIS — I10 ESSENTIAL HYPERTENSION: ICD-10-CM

## 2024-03-07 DIAGNOSIS — E11.9 TYPE 2 DIABETES MELLITUS TREATED WITHOUT INSULIN: ICD-10-CM

## 2024-03-07 PROBLEM — R23.2 HOT FLASHES: Status: RESOLVED | Noted: 2023-09-07 | Resolved: 2024-03-07

## 2024-03-07 LAB
ALBUMIN SERPL-MCNC: 4.1 G/DL (ref 3.5–5.2)
ALBUMIN UR-MCNC: 1.7 MG/DL
ALBUMIN/GLOB SERPL: 1.4 G/DL
ALP SERPL-CCNC: 51 U/L (ref 39–117)
ALT SERPL W P-5'-P-CCNC: 12 U/L (ref 1–33)
ANION GAP SERPL CALCULATED.3IONS-SCNC: 11.4 MMOL/L (ref 5–15)
AST SERPL-CCNC: 13 U/L (ref 1–32)
BACTERIA UR QL AUTO: NORMAL /HPF
BILIRUB SERPL-MCNC: 0.4 MG/DL (ref 0–1.2)
BILIRUB UR QL STRIP: NEGATIVE
BUN SERPL-MCNC: 15 MG/DL (ref 8–23)
BUN/CREAT SERPL: 19.7 (ref 7–25)
CALCIUM SPEC-SCNC: 9.6 MG/DL (ref 8.6–10.5)
CHLORIDE SERPL-SCNC: 101 MMOL/L (ref 98–107)
CHOLEST SERPL-MCNC: 155 MG/DL (ref 0–200)
CLARITY UR: CLEAR
CO2 SERPL-SCNC: 27.6 MMOL/L (ref 22–29)
COLOR UR: YELLOW
CREAT SERPL-MCNC: 0.76 MG/DL (ref 0.57–1)
CREAT UR-MCNC: 84.5 MG/DL
DEPRECATED RDW RBC AUTO: 40.1 FL (ref 37–54)
EGFRCR SERPLBLD CKD-EPI 2021: 85.5 ML/MIN/1.73
ERYTHROCYTE [DISTWIDTH] IN BLOOD BY AUTOMATED COUNT: 12.2 % (ref 12.3–15.4)
GLOBULIN UR ELPH-MCNC: 3 GM/DL
GLUCOSE SERPL-MCNC: 183 MG/DL (ref 65–99)
GLUCOSE UR STRIP-MCNC: NEGATIVE MG/DL
HBA1C MFR BLD: 7.7 % (ref 4.8–5.6)
HCT VFR BLD AUTO: 40 % (ref 34–46.6)
HDLC SERPL-MCNC: 49 MG/DL (ref 40–60)
HGB BLD-MCNC: 13.4 G/DL (ref 12–15.9)
HGB UR QL STRIP.AUTO: ABNORMAL
KETONES UR QL STRIP: NEGATIVE
LDLC SERPL CALC-MCNC: 85 MG/DL (ref 0–100)
LDLC/HDLC SERPL: 1.69 {RATIO}
LEUKOCYTE ESTERASE UR QL STRIP.AUTO: NEGATIVE
MCH RBC QN AUTO: 29.8 PG (ref 26.6–33)
MCHC RBC AUTO-ENTMCNC: 33.5 G/DL (ref 31.5–35.7)
MCV RBC AUTO: 88.9 FL (ref 79–97)
MICROALBUMIN/CREAT UR: 20.1 MG/G (ref 0–29)
NITRITE UR QL STRIP: NEGATIVE
PH UR STRIP.AUTO: 7.5 [PH] (ref 5–8)
PLATELET # BLD AUTO: 322 10*3/MM3 (ref 140–450)
PMV BLD AUTO: 9.4 FL (ref 6–12)
POTASSIUM SERPL-SCNC: 3.5 MMOL/L (ref 3.5–5.2)
PROT SERPL-MCNC: 7.1 G/DL (ref 6–8.5)
PROT UR QL STRIP: NEGATIVE
RBC # BLD AUTO: 4.5 10*6/MM3 (ref 3.77–5.28)
RBC # UR STRIP: NORMAL /HPF
REF LAB TEST METHOD: NORMAL
SODIUM SERPL-SCNC: 140 MMOL/L (ref 136–145)
SP GR UR STRIP: 1.02 (ref 1–1.03)
SQUAMOUS #/AREA URNS HPF: NORMAL /HPF
TRIGL SERPL-MCNC: 117 MG/DL (ref 0–150)
TSH SERPL DL<=0.05 MIU/L-ACNC: 1.67 UIU/ML (ref 0.27–4.2)
UROBILINOGEN UR QL STRIP: ABNORMAL
VLDLC SERPL-MCNC: 21 MG/DL (ref 5–40)
WBC # UR STRIP: NORMAL /HPF
WBC NRBC COR # BLD AUTO: 6.56 10*3/MM3 (ref 3.4–10.8)

## 2024-03-07 PROCEDURE — 82043 UR ALBUMIN QUANTITATIVE: CPT | Performed by: FAMILY MEDICINE

## 2024-03-07 PROCEDURE — 82570 ASSAY OF URINE CREATININE: CPT | Performed by: FAMILY MEDICINE

## 2024-03-07 PROCEDURE — 85027 COMPLETE CBC AUTOMATED: CPT | Performed by: FAMILY MEDICINE

## 2024-03-07 PROCEDURE — 36415 COLL VENOUS BLD VENIPUNCTURE: CPT | Performed by: FAMILY MEDICINE

## 2024-03-07 PROCEDURE — 81001 URINALYSIS AUTO W/SCOPE: CPT | Performed by: FAMILY MEDICINE

## 2024-03-07 PROCEDURE — 83036 HEMOGLOBIN GLYCOSYLATED A1C: CPT | Performed by: FAMILY MEDICINE

## 2024-03-07 PROCEDURE — 80053 COMPREHEN METABOLIC PANEL: CPT | Performed by: FAMILY MEDICINE

## 2024-03-07 PROCEDURE — 80061 LIPID PANEL: CPT | Performed by: FAMILY MEDICINE

## 2024-03-07 PROCEDURE — 84443 ASSAY THYROID STIM HORMONE: CPT | Performed by: FAMILY MEDICINE

## 2024-03-07 NOTE — ASSESSMENT & PLAN NOTE
ADVICE GIVEN RE:  SEATBELT USE, ALCOHOL USE, HEALTHY DIET, ROUTINE EYE AND DENTAL EXAM, ROUTINE VACCINATIONS.    SHE WILL SEE ONE OF THE NP'S FOR A WWE

## 2024-03-07 NOTE — PROGRESS NOTES
The ABCs of the Annual Wellness Visit  Subsequent Medicare Wellness Visit    Fide Casillas is a 68 y.o. female who presents for a Subsequent Medicare Wellness Visit.    The following portions of the patient's history were reviewed and   updated as appropriate: allergies, current medications, past family history, past medical history, past social history, past surgical history, and problem list.    Compared to one year ago, the patient feels her physical   health is the same.    Compared to one year ago, the patient feels her mental   health is the same.    Recent Hospitalizations:  She was not admitted to the hospital during the last year.       Current Medical Providers:  Patient Care Team:  Jono Lowe MD as PCP - General (Family Medicine)    Outpatient Medications Prior to Visit   Medication Sig Dispense Refill    ascorbic acid (VITAMIN C) 1000 MG tablet       aspirin 81 MG EC tablet Take 1 tablet by mouth Daily.      Blood Glucose Monitoring Suppl device Check blood sugar twice daily. 1 each 0    Calcium Citrate 1040 MG tablet       Cholecalciferol 50 MCG (2000 UT) capsule Vitamin D3 2,000 unit oral capsule take 1 capsule by oral route daily   Active      fenofibrate (TRICOR) 145 MG tablet Take 1 tablet by mouth Daily. 90 tablet 1    glucose blood test strip 1 each by Other route 2 (Two) Times a Day. 200 each 3    Lancets misc Check blood sugar twice daily. 200 each 3    lisinopril-hydrochlorothiazide (PRINZIDE,ZESTORETIC) 20-25 MG per tablet TAKE 1 TABLET DAILY 90 tablet 1    meloxicam (MOBIC) 15 MG tablet 1 tablet Daily As Needed.      metFORMIN (GLUCOPHAGE) 500 MG tablet TAKE 1 TABLET BY MOUTH TWICE DAILY WITH MEALS 180 tablet 1    omeprazole (priLOSEC) 40 MG capsule Take 1 capsule by mouth.       No facility-administered medications prior to visit.       No opioid medication identified on active medication list. I have reviewed chart for other potential  high risk medication/s  "and harmful drug interactions in the elderly.        Aspirin is on active medication list. Aspirin use is indicated based on review of current medical condition/s. Pros and cons of this therapy have been discussed today. Benefits of this medication outweigh potential harm.  Patient has been encouraged to continue taking this medication.  .      Patient Active Problem List   Diagnosis    Type 2 diabetes mellitus treated without insulin    Essential hypertension    High cholesterol    GERD without esophagitis    Medicare annual wellness visit, subsequent    Generalized osteoarthritis     Advance Care Planning   Advance Care Planning     Advance Directive is not on file.  ACP discussion was held with the patient during this visit. Patient does not have an advance directive, declines further assistance.     Objective    Vitals:    03/07/24 0818   BP: 137/85   BP Location: Left arm   Patient Position: Sitting   Pulse: 81   Temp: 98.8 °F (37.1 °C)   TempSrc: Oral   SpO2: 95%   Weight: 92.3 kg (203 lb 6.4 oz)   Height: 160 cm (62.99\")   PainSc: 0-No pain     Estimated body mass index is 36.04 kg/m² as calculated from the following:    Height as of this encounter: 160 cm (62.99\").    Weight as of this encounter: 92.3 kg (203 lb 6.4 oz).    Class 2 Severe Obesity (BMI >=35 and <=39.9). Obesity-related health conditions include the following: hypertension. Obesity is unchanged. BMI is is above average; BMI management plan is completed. We discussed portion control and increasing exercise.      Does the patient have evidence of cognitive impairment? No    Lab Results   Component Value Date    HGBA1C 7.40 (H) 12/14/2023        HEALTH RISK ASSESSMENT    Smoking Status:  Social History     Tobacco Use   Smoking Status Never   Smokeless Tobacco Never     Alcohol Consumption:  Social History     Substance and Sexual Activity   Alcohol Use Never     Fall Risk Screen:    STEADI Fall Risk Assessment was completed, and patient is at " HIGH risk for falls. Assessment completed on:3/7/2024    Depression Screening:      3/7/2024     8:22 AM   PHQ-2/PHQ-9 Depression Screening   Little Interest or Pleasure in Doing Things 0-->not at all   Feeling Down, Depressed or Hopeless 0-->not at all   PHQ-9: Brief Depression Severity Measure Score 0       Health Habits and Functional and Cognitive Screening:      3/7/2024     8:22 AM   Functional & Cognitive Status   Do you have difficulty preparing food and eating? No   Do you have difficulty bathing yourself, getting dressed or grooming yourself? No   Do you have difficulty using the toilet? No   Do you have difficulty moving around from place to place? No   Do you have trouble with steps or getting out of a bed or a chair? No   Current Diet Well Balanced Diet   Dental Exam Up to date   Eye Exam Up to date   Exercise (times per week) 0 times per week   Current Exercises Include No Regular Exercise   Do you need help using the phone?  No   Are you deaf or do you have serious difficulty hearing?  No   Do you need help to go to places out of walking distance? No   Do you need help shopping? No   Do you need help preparing meals?  No   Do you need help with housework?  No   Do you need help with laundry? No   Do you need help taking your medications? No   Do you need help managing money? No   Do you ever drive or ride in a car without wearing a seat belt? No   Have you felt unusual stress, anger or loneliness in the last month? No   Who do you live with? Spouse   If you need help, do you have trouble finding someone available to you? No   Have you been bothered in the last four weeks by sexual problems? No   Do you have difficulty concentrating, remembering or making decisions? No       Age-appropriate Screening Schedule:  Refer to the list below for future screening recommendations based on patient's age, sex and/or medical conditions. Orders for these recommended tests are listed in the plan section. The patient  has been provided with a written plan.    Health Maintenance   Topic Date Due    RSV Vaccine - Adults (1 - 1-dose 60+ series) Never done    INFLUENZA VACCINE  08/01/2023    COVID-19 Vaccine (5 - 2023-24 season) 09/01/2023    URINE MICROALBUMIN  03/02/2024    BMI FOLLOWUP  03/02/2024    ZOSTER VACCINE (1 of 2) 09/07/2025 (Originally 7/30/2005)    DIABETIC EYE EXAM  03/09/2024    HEMOGLOBIN A1C  06/14/2024    TDAP/TD VACCINES (2 - Td or Tdap) 07/10/2024    DIABETIC FOOT EXAM  09/07/2024    LIPID PANEL  09/07/2024    ANNUAL WELLNESS VISIT  03/07/2025    MAMMOGRAM  10/26/2025    DXA SCAN  10/26/2025    COLORECTAL CANCER SCREENING  06/12/2028    HEPATITIS C SCREENING  Completed    Pneumococcal Vaccine 65+  Completed                  CMS Preventative Services Quick Reference  Risk Factors Identified During Encounter  None Identified  The above risks/problems have been discussed with the patient.  Pertinent information has been shared with the patient in the After Visit Summary.  An After Visit Summary and PPPS were made available to the patient.    Follow Up:   Next Medicare Wellness visit to be scheduled in 1 year.       Additional E&M Note during same encounter follows:  Patient has multiple medical problems which are significant and separately identifiable that require additional work above and beyond the Medicare Wellness Visit.      Chief Complaint  Medicare Wellness-subsequent    Subjective        --TOLERATING BLOOD PRESSURE MEDICATION WITHOUT APPARENT SIDE EFFECTS  --LAST LIPIDS WERE OK, NO ISSUES WITH MEDS  --GERD IS WELL CONTROLLED WITH THE PPI  --LAST HGA1C W2AS 7.4 %, HAS NOT BEEN ABLE TO WALK LATELY, GLUCOSE IS USUALLY < 200 AT HOME      Rivka Casillas is also being seen today for SUGAR, BLOOD PRESSURE, CHOLESTEROL    Review of Systems   Constitutional:  Negative for activity change, appetite change, chills, fatigue and fever.   HENT:  Negative for congestion, ear pain, hearing loss, rhinorrhea and sore  "throat.    Eyes:  Negative for discharge and visual disturbance.   Respiratory:  Negative for cough and shortness of breath.    Cardiovascular:  Negative for chest pain, palpitations and leg swelling.   Gastrointestinal:  Negative for abdominal pain, diarrhea, nausea and vomiting.   Genitourinary:  Negative for dysuria and hematuria.   Musculoskeletal:  Negative for arthralgias and myalgias.   Psychiatric/Behavioral:  Negative for dysphoric mood.        Objective   Vital Signs:  /85 (BP Location: Left arm, Patient Position: Sitting)   Pulse 81   Temp 98.8 °F (37.1 °C) (Oral)   Ht 160 cm (62.99\")   Wt 92.3 kg (203 lb 6.4 oz)   SpO2 95%   BMI 36.04 kg/m²     Physical Exam  Vitals and nursing note reviewed.   Constitutional:       General: She is not in acute distress.     Appearance: Normal appearance.   HENT:      Right Ear: Tympanic membrane normal.      Left Ear: Tympanic membrane normal.      Mouth/Throat:      Pharynx: Oropharynx is clear.   Eyes:      Conjunctiva/sclera: Conjunctivae normal.   Cardiovascular:      Rate and Rhythm: Normal rate and regular rhythm.      Heart sounds: Normal heart sounds. No murmur heard.  Pulmonary:      Effort: Pulmonary effort is normal.      Breath sounds: Normal breath sounds.   Abdominal:      General: Bowel sounds are normal.      Palpations: Abdomen is soft.      Tenderness: There is no abdominal tenderness.   Musculoskeletal:      Cervical back: Neck supple.      Right lower leg: No edema.      Left lower leg: No edema.   Lymphadenopathy:      Cervical: No cervical adenopathy.   Neurological:      General: No focal deficit present.      Mental Status: She is alert.      Cranial Nerves: No cranial nerve deficit.      Coordination: Coordination normal.      Gait: Gait normal.   Psychiatric:         Mood and Affect: Mood normal.         Behavior: Behavior normal.          The following data was reviewed by: Jono Lowe MD on 03/07/2024:  Common labs  "         5/30/2023    10:34 9/7/2023    09:36 12/14/2023    08:22   Common Labs   Glucose 161  172  173    BUN 18  17  15    Creatinine 0.94  0.93  0.80    Sodium 144  142  142    Potassium 3.6  5.1  3.6    Chloride 106  104  103    Calcium 10.6  10.1  9.5    Albumin 4.0  4.4  4.2    Total Bilirubin 0.4  0.4  0.5    Alkaline Phosphatase 41  47  53    AST (SGOT) 18  18  16    ALT (SGPT) 29  23  20    WBC  6.27     Hemoglobin  13.9     Hematocrit  41.5     Platelets  270     Total Cholesterol  152     Triglycerides  102     HDL Cholesterol  55     LDL Cholesterol   78     Hemoglobin A1C 7.50  7.40  7.40                 Assessment and Plan   Diagnoses and all orders for this visit:    1. Medicare annual wellness visit, subsequent (Primary)  Assessment & Plan:  ADVICE GIVEN RE:  SEATBELT USE, ALCOHOL USE, HEALTHY DIET, ROUTINE EYE AND DENTAL EXAM, ROUTINE VACCINATIONS.    SHE WILL SEE ONE OF THE NP'S FOR A WWE      2. Type 2 diabetes mellitus treated without insulin  Assessment & Plan:  Diabetes is stable.   Continue current treatment regimen.  Diabetes will be reassessed in 6 months  NOT QUITE AT GOAL, WILL RECHECK LABS AND CONSIDER INCREASING MEDS     Orders:  -     Hemoglobin A1c    3. High cholesterol  Assessment & Plan:   Lipid abnormalities are stable    Plan:  Continue same medication/s without change.      Discussed medication dosage, use, side effects, and goals of treatment in detail.    Counseled patient on lifestyle modifications to help control hyperlipidemia.     Patient Treatment Goals:   LDL goal is less than 70    Followup in 6 months.    Orders:  -     Lipid Panel    4. GERD without esophagitis  Assessment & Plan:  IMPROVED WITH CURRENT TREATMENT, CONTINUE SAME, WILL REEVALUATE AT NEXT VISIT     Orders:  -     CBC (No Diff)  -     Microalbumin / Creatinine Urine Ratio - Urine, Clean Catch    5. Essential hypertension  Assessment & Plan:  Hypertension is stable and controlled  Continue current treatment  regimen.  Blood pressure will be reassessed in 6 months.    Orders:  -     Comprehensive Metabolic Panel  -     TSH Rfx On Abnormal To Free T4  -     Urinalysis With Culture If Indicated -             Follow Up   Return in about 6 months (around 9/7/2024).  Patient was given instructions and counseling regarding her condition or for health maintenance advice. Please see specific information pulled into the AVS if appropriate.

## 2024-03-07 NOTE — ASSESSMENT & PLAN NOTE
Diabetes is stable.   Continue current treatment regimen.  Diabetes will be reassessed in 6 months  NOT QUITE AT GOAL, WILL RECHECK LABS AND CONSIDER INCREASING MEDS

## 2024-03-08 DIAGNOSIS — I10 ESSENTIAL HYPERTENSION: ICD-10-CM

## 2024-03-08 DIAGNOSIS — E11.9 TYPE 2 DIABETES MELLITUS TREATED WITHOUT INSULIN: Primary | ICD-10-CM

## 2024-04-08 DIAGNOSIS — E78.00 HIGH CHOLESTEROL: ICD-10-CM

## 2024-04-08 RX ORDER — FENOFIBRATE 145 MG/1
145 TABLET, COATED ORAL DAILY
Qty: 90 TABLET | Refills: 1 | Status: SHIPPED | OUTPATIENT
Start: 2024-04-08

## 2024-05-20 DIAGNOSIS — E11.9 TYPE 2 DIABETES MELLITUS TREATED WITHOUT INSULIN: ICD-10-CM

## 2024-07-09 ENCOUNTER — TELEPHONE (OUTPATIENT)
Dept: FAMILY MEDICINE CLINIC | Age: 69
End: 2024-07-09
Payer: MEDICARE

## 2024-07-09 NOTE — TELEPHONE ENCOUNTER
[facility] [employer] message sent regarding over due labs ord by pcp 3/8/24, due 6/7/24-1st attempt

## 2024-07-16 ENCOUNTER — LAB (OUTPATIENT)
Dept: LAB | Facility: HOSPITAL | Age: 69
End: 2024-07-16
Payer: MEDICARE

## 2024-07-16 DIAGNOSIS — E11.9 TYPE 2 DIABETES MELLITUS TREATED WITHOUT INSULIN: ICD-10-CM

## 2024-07-16 DIAGNOSIS — I10 ESSENTIAL HYPERTENSION: ICD-10-CM

## 2024-07-16 LAB
ALBUMIN SERPL-MCNC: 4.4 G/DL (ref 3.5–5.2)
ALBUMIN/GLOB SERPL: 1.8 G/DL
ALP SERPL-CCNC: 54 U/L (ref 39–117)
ALT SERPL W P-5'-P-CCNC: 22 U/L (ref 1–33)
ANION GAP SERPL CALCULATED.3IONS-SCNC: 13.4 MMOL/L (ref 5–15)
AST SERPL-CCNC: 18 U/L (ref 1–32)
BILIRUB SERPL-MCNC: 0.4 MG/DL (ref 0–1.2)
BUN SERPL-MCNC: 22 MG/DL (ref 8–23)
BUN/CREAT SERPL: 26.2 (ref 7–25)
CALCIUM SPEC-SCNC: 9.5 MG/DL (ref 8.6–10.5)
CHLORIDE SERPL-SCNC: 114 MMOL/L (ref 98–107)
CO2 SERPL-SCNC: 28.6 MMOL/L (ref 22–29)
CREAT SERPL-MCNC: 0.84 MG/DL (ref 0.57–1)
EGFRCR SERPLBLD CKD-EPI 2021: 75.8 ML/MIN/1.73
GLOBULIN UR ELPH-MCNC: 2.5 GM/DL
GLUCOSE SERPL-MCNC: 178 MG/DL (ref 65–99)
HBA1C MFR BLD: 7.3 % (ref 4.8–5.6)
POTASSIUM SERPL-SCNC: 3.8 MMOL/L (ref 3.5–5.2)
PROT SERPL-MCNC: 6.9 G/DL (ref 6–8.5)
SODIUM SERPL-SCNC: 156 MMOL/L (ref 136–145)

## 2024-07-16 PROCEDURE — 36415 COLL VENOUS BLD VENIPUNCTURE: CPT

## 2024-07-16 PROCEDURE — 83036 HEMOGLOBIN GLYCOSYLATED A1C: CPT | Performed by: FAMILY MEDICINE

## 2024-08-20 DIAGNOSIS — I10 ESSENTIAL HYPERTENSION: ICD-10-CM

## 2024-08-20 RX ORDER — LISINOPRIL AND HYDROCHLOROTHIAZIDE 25; 20 MG/1; MG/1
1 TABLET ORAL DAILY
Qty: 90 TABLET | Refills: 1 | Status: SHIPPED | OUTPATIENT
Start: 2024-08-20

## 2024-08-28 ENCOUNTER — OFFICE VISIT (OUTPATIENT)
Dept: FAMILY MEDICINE CLINIC | Age: 69
End: 2024-08-28
Payer: MEDICARE

## 2024-08-28 VITALS
BODY MASS INDEX: 35.61 KG/M2 | WEIGHT: 201 LBS | HEART RATE: 79 BPM | HEIGHT: 63 IN | SYSTOLIC BLOOD PRESSURE: 139 MMHG | DIASTOLIC BLOOD PRESSURE: 62 MMHG | TEMPERATURE: 98.6 F

## 2024-08-28 DIAGNOSIS — I10 ESSENTIAL HYPERTENSION: Primary | ICD-10-CM

## 2024-08-28 DIAGNOSIS — E11.9 TYPE 2 DIABETES MELLITUS TREATED WITHOUT INSULIN: ICD-10-CM

## 2024-08-28 DIAGNOSIS — R42 EPISODE OF DIZZINESS: ICD-10-CM

## 2024-08-28 DIAGNOSIS — K21.9 GERD WITHOUT ESOPHAGITIS: ICD-10-CM

## 2024-08-28 DIAGNOSIS — E78.00 HIGH CHOLESTEROL: ICD-10-CM

## 2024-08-28 PROBLEM — Z00.00 MEDICARE ANNUAL WELLNESS VISIT, SUBSEQUENT: Status: RESOLVED | Noted: 2022-02-28 | Resolved: 2024-08-28

## 2024-08-28 PROCEDURE — 3075F SYST BP GE 130 - 139MM HG: CPT | Performed by: FAMILY MEDICINE

## 2024-08-28 PROCEDURE — G2211 COMPLEX E/M VISIT ADD ON: HCPCS | Performed by: FAMILY MEDICINE

## 2024-08-28 PROCEDURE — 1159F MED LIST DOCD IN RCRD: CPT | Performed by: FAMILY MEDICINE

## 2024-08-28 PROCEDURE — 1126F AMNT PAIN NOTED NONE PRSNT: CPT | Performed by: FAMILY MEDICINE

## 2024-08-28 PROCEDURE — 99214 OFFICE O/P EST MOD 30 MIN: CPT | Performed by: FAMILY MEDICINE

## 2024-08-28 PROCEDURE — 3051F HG A1C>EQUAL 7.0%<8.0%: CPT | Performed by: FAMILY MEDICINE

## 2024-08-28 PROCEDURE — 1160F RVW MEDS BY RX/DR IN RCRD: CPT | Performed by: FAMILY MEDICINE

## 2024-08-28 PROCEDURE — 3078F DIAST BP <80 MM HG: CPT | Performed by: FAMILY MEDICINE

## 2024-08-28 RX ORDER — AMLODIPINE BESYLATE 5 MG/1
5 TABLET ORAL DAILY
COMMUNITY
Start: 2024-07-26 | End: 2024-08-28 | Stop reason: SDUPTHER

## 2024-08-28 RX ORDER — AMLODIPINE BESYLATE 5 MG/1
5 TABLET ORAL DAILY
Qty: 30 TABLET | Refills: 1 | Status: SHIPPED | OUTPATIENT
Start: 2024-08-28

## 2024-08-28 RX ORDER — CARVEDILOL 3.12 MG/1
3.12 TABLET ORAL 2 TIMES DAILY WITH MEALS
COMMUNITY
Start: 2024-08-05 | End: 2025-08-05

## 2024-08-28 NOTE — PROGRESS NOTES
Chief Complaint  Hypertension (Discuss BP and new meds)    Subjective          Rivka Casillas presents to [facility]  History of Present Illness  --GERD IS WELL CONTROLLED WITH THE PPI  --RECENT LIPIDS WERE OK, NO ISSUES WITH THE STATIN  --HGA1C IS DOWN TO 7.3 % ON CURRENT MEDS AND DIET  --TOLERATING BLOOD PRESSURE MEDICATION WITHOUT APPARENT SIDE EFFECTS, BP HAD BEEN DOING WELL.  HAD AN EPISODE OF DIZZINESS AND ELEVATED BP.  NEGATIVE W/U IN THE ER.  AMLODIPINE WAS ADDED.  SAW CARDIOLOGY WHO ALSO ADDED CARVEDILOL AND WILL BE PERFORMING AN ECHO.  FEELS AT BASELINE NOW.          No Known Allergies     Health Maintenance Due   Topic Date Due    COVID-19 Vaccine (5 - 2023-24 season) 09/01/2023    INFLUENZA VACCINE  08/01/2024        Current Outpatient Medications on File Prior to Visit   Medication Sig    ascorbic acid (VITAMIN C) 1000 MG tablet     aspirin 81 MG EC tablet Take 1 tablet by mouth Daily.    Blood Glucose Monitoring Suppl device Check blood sugar twice daily.    Calcium Citrate 1040 MG tablet     carvedilol (COREG) 3.125 MG tablet Take 1 tablet by mouth 2 (Two) Times a Day With Meals.    Cholecalciferol 50 MCG (2000 UT) capsule Vitamin D3 2,000 unit oral capsule take 1 capsule by oral route daily   Active    fenofibrate (TRICOR) 145 MG tablet TAKE 1 TABLET DAILY    glucose blood test strip 1 each by Other route 2 (Two) Times a Day.    Lancets misc Check blood sugar twice daily.    lisinopril-hydrochlorothiazide (PRINZIDE,ZESTORETIC) 20-25 MG per tablet Take 1 tablet by mouth Daily.    meloxicam (MOBIC) 15 MG tablet 1 tablet Daily As Needed.    metFORMIN (GLUCOPHAGE) 500 MG tablet TAKE 1 TABLET TWICE A DAY WITH MEALS    omeprazole (priLOSEC) 40 MG capsule Take 1 capsule by mouth.     No current facility-administered medications on file prior to visit.       Immunization History   Administered Date(s) Administered    COVID-19 (PFIZER) Purple Cap Monovalent 03/31/2021,  "04/21/2021    Covid-19 (Pfizer) Gray Cap Monovalent 02/28/2022, 07/25/2022    Hepatitis A 12/28/2018, 07/13/2019    Influenza, Unspecified 02/12/2021    Pneumococcal Conjugate 20-Valent (PCV20) 03/02/2023    Tdap 07/10/2014       Review of Systems   Constitutional:  Negative for activity change, appetite change, chills, fatigue and fever.   HENT:  Negative for congestion, ear pain, rhinorrhea and sore throat.    Respiratory:  Negative for cough and shortness of breath.    Cardiovascular:  Negative for chest pain, palpitations and leg swelling.   Gastrointestinal:  Negative for abdominal pain, constipation, diarrhea, nausea and vomiting.   Musculoskeletal:  Negative for arthralgias and myalgias.   Neurological:  Negative for headache.        Objective     /62 (BP Location: Left arm, Patient Position: Sitting)   Pulse 79   Temp 98.6 °F (37 °C) (Oral)   Ht 160 cm (63\")   Wt 91.2 kg (201 lb)   BMI 35.61 kg/m²       Physical Exam  Vitals and nursing note reviewed.   Constitutional:       General: She is not in acute distress.     Appearance: Normal appearance.   Neck:      Vascular: No carotid bruit.   Cardiovascular:      Rate and Rhythm: Normal rate and regular rhythm.      Heart sounds: Normal heart sounds. No murmur heard.  Pulmonary:      Effort: Pulmonary effort is normal.      Breath sounds: Normal breath sounds.   Abdominal:      Palpations: Abdomen is soft.      Tenderness: There is no abdominal tenderness.   Musculoskeletal:      Cervical back: Neck supple.      Right lower leg: No edema.      Left lower leg: No edema.   Lymphadenopathy:      Cervical: No cervical adenopathy.   Neurological:      General: No focal deficit present.      Mental Status: She is alert.      Cranial Nerves: No cranial nerve deficit.      Coordination: Coordination normal.      Gait: Gait normal.   Psychiatric:         Mood and Affect: Mood normal.         Behavior: Behavior normal.         Result Review :                   "           Assessment and Plan      Diagnoses and all orders for this visit:    1. Essential hypertension (Primary)  Assessment & Plan:  Hypertension is stable and controlled  Continue current treatment regimen.  Blood pressure will be reassessed in 6 months  IMPROVED CURRENTLY WITH THE ADDITION OF TWO NEW MEDS.  HAVE ECHO AND SEE CARDIOLOGY AS PLANNED.  MAY BE ABLE TO SIMPLIFY MED LIST AT NEXT VISIT IN TWO MONTHS.  .    Orders:  -     amLODIPine (NORVASC) 5 MG tablet; Take 1 tablet by mouth Daily.  Dispense: 30 tablet; Refill: 1    2. GERD without esophagitis  Assessment & Plan:  IMPROVED WITH CURRENT TREATMENT, CONTINUE SAME, WILL REEVALUATE AT NEXT VISIT       3. High cholesterol  Assessment & Plan:   Lipid abnormalities are stable    Plan:  Continue same medication/s without change.      Discussed medication dosage, use, side effects, and goals of treatment in detail.    Counseled patient on lifestyle modifications to help control hyperlipidemia.     Patient Treatment Goals:   LDL goal is less than 70    Followup in 6 months.      4. Type 2 diabetes mellitus treated without insulin  Assessment & Plan:  Diabetes is stable.   Continue current treatment regimen.  Diabetes will be reassessed in 6 months  NOT AT GOAL BUT IMPROVED.  WILL REASSESS AT NEXT VISIT AND ADJUST MEDS FURTHER IF INDICATED       5. Episode of dizziness  Assessment & Plan:  NONSPECIFIC, RESOLVED.  LABS AND IMAGING REPORTS REVIEWED.  UNCLEAR IF THE ELEVATED BP WAS A CAUSE OR AN EFFECT OF THE SYMPTOMS.  PROCEED WITH ECHO AS PLANNED.  CONSIDER DOPPLER AND EEG.                Follow Up     Return in about 2 months (around 10/28/2024).    Patient was given instructions and counseling regarding her condition or for health maintenance advice. Please see specific information pulled into the AVS if appropriate.

## 2024-08-30 PROBLEM — R42 EPISODE OF DIZZINESS: Status: ACTIVE | Noted: 2024-08-30

## 2024-08-30 NOTE — ASSESSMENT & PLAN NOTE
NONSPECIFIC, RESOLVED.  LABS AND IMAGING REPORTS REVIEWED.  UNCLEAR IF THE ELEVATED BP WAS A CAUSE OR AN EFFECT OF THE SYMPTOMS.  PROCEED WITH ECHO AS PLANNED.  CONSIDER DOPPLER AND EEG.

## 2024-08-30 NOTE — ASSESSMENT & PLAN NOTE
Hypertension is stable and controlled  Continue current treatment regimen.  Blood pressure will be reassessed in 6 months  IMPROVED CURRENTLY WITH THE ADDITION OF TWO NEW MEDS.  HAVE ECHO AND SEE CARDIOLOGY AS PLANNED.  MAY BE ABLE TO SIMPLIFY MED LIST AT NEXT VISIT IN TWO MONTHS.  .

## 2024-08-30 NOTE — ASSESSMENT & PLAN NOTE
Diabetes is stable.   Continue current treatment regimen.  Diabetes will be reassessed in 6 months  NOT AT GOAL BUT IMPROVED.  WILL REASSESS AT NEXT VISIT AND ADJUST MEDS FURTHER IF INDICATED

## 2024-09-12 ENCOUNTER — OFFICE VISIT (OUTPATIENT)
Dept: FAMILY MEDICINE CLINIC | Age: 69
End: 2024-09-12
Payer: MEDICARE

## 2024-09-12 VITALS
SYSTOLIC BLOOD PRESSURE: 131 MMHG | DIASTOLIC BLOOD PRESSURE: 57 MMHG | BODY MASS INDEX: 35.61 KG/M2 | WEIGHT: 201 LBS | HEIGHT: 63 IN | TEMPERATURE: 98.1 F | HEART RATE: 75 BPM

## 2024-09-12 DIAGNOSIS — Z01.419 ROUTINE GYNECOLOGICAL EXAMINATION: Primary | ICD-10-CM

## 2024-09-12 DIAGNOSIS — Z12.31 SCREENING MAMMOGRAM FOR BREAST CANCER: ICD-10-CM

## 2024-09-12 PROCEDURE — 87624 HPV HI-RISK TYP POOLED RSLT: CPT | Performed by: NURSE PRACTITIONER

## 2024-09-12 PROCEDURE — G0123 SCREEN CERV/VAG THIN LAYER: HCPCS | Performed by: NURSE PRACTITIONER

## 2024-09-12 NOTE — PROGRESS NOTES
Chief Complaint  Gynecologic Exam    Subjective          Rivka Casillas presents to [facility]    History of Present Illness  GYN/[employer]  Postmeneopausal  Last pap  and saw LJ  for [employer], pap was deferred that visit    History of abnormal pap smears: yes, but per pt repeats neg, did see GYN in the past   BSE: yes   No symptoms today   Last mammogram: 10- negative   Sexually active : yes occ  G 3 P 3    PMH changes:     DM  GERD  HTN  HLD  Colon polyps         Surgery:    Cataracts  C section X 1  CLN  BTL  T&A  D&C  Family history :    Mother:  age 66 COPD/HLD  Father:  age 55, lung Cancer  Sister: DM, COPD  Brother: cancer       Social history :    Occupation: retired, factory work   Marital status:    Children: 3          Past Medical History:   Diagnosis Date    Allergies     Cataract 2022    had cataract surgery and had my distance vision corrected    Diet-controlled type 2 diabetes mellitus     Diverticulosis     Essential (primary) hypertension     Gastro-esophageal reflux disease without esophagitis     Hereditary and idiopathic neuropathy, unspecified     Personal history of colonic polyps     Psoriasis     Pure hyperglyceridemia     Vitamin D deficiency, unspecified        No Known Allergies     Past Surgical History:   Procedure Laterality Date    CATARACT EXTRACTION Bilateral      SECTION      X1    COLONOSCOPY  2018    NORMAL    DILATATION AND CURETTAGE      TONSILLECTOMY AND ADENOIDECTOMY      TUBAL ABDOMINAL LIGATION Bilateral         Social History     Tobacco Use    Smoking status: Never    Smokeless tobacco: Never   Substance Use Topics    Alcohol use: Never       Family History   Problem Relation Age of Onset    Breast cancer Other     Diabetes type II Other     Diabetes Maternal Grandmother     Diabetes Sister     Vision loss Brother     Cancer Brother     COPD Mother     Hyperlipidemia Mother     Cancer Father      COPD Sister         Health Maintenance Due   Topic Date Due    COVID-19 Vaccine (5 - 2023-24 season) 09/01/2024    DIABETIC FOOT EXAM  09/07/2024    INFLUENZA VACCINE  08/01/2024        Current Outpatient Medications on File Prior to Visit   Medication Sig    amLODIPine (NORVASC) 5 MG tablet Take 1 tablet by mouth Daily.    ascorbic acid (VITAMIN C) 1000 MG tablet     aspirin 81 MG EC tablet Take 1 tablet by mouth Daily.    Blood Glucose Monitoring Suppl device Check blood sugar twice daily.    Calcium Citrate 1040 MG tablet     carvedilol (COREG) 3.125 MG tablet Take 1 tablet by mouth 2 (Two) Times a Day With Meals.    Cholecalciferol 50 MCG (2000 UT) capsule Vitamin D3 2,000 unit oral capsule take 1 capsule by oral route daily   Active    fenofibrate (TRICOR) 145 MG tablet TAKE 1 TABLET DAILY    glucose blood test strip 1 each by Other route 2 (Two) Times a Day.    Lancets misc Check blood sugar twice daily.    lisinopril-hydrochlorothiazide (PRINZIDE,ZESTORETIC) 20-25 MG per tablet Take 1 tablet by mouth Daily.    meloxicam (MOBIC) 15 MG tablet 1 tablet Daily As Needed.    metFORMIN (GLUCOPHAGE) 500 MG tablet TAKE 1 TABLET TWICE A DAY WITH MEALS    omeprazole (priLOSEC) 40 MG capsule Take 1 capsule by mouth.     No current facility-administered medications on file prior to visit.       Immunization History   Administered Date(s) Administered    COVID-19 (PFIZER) Purple Cap Monovalent 03/31/2021, 04/21/2021    Covid-19 (Pfizer) Gray Cap Monovalent 02/28/2022, 07/25/2022    Hepatitis A 12/28/2018, 07/13/2019    Influenza, Unspecified 02/12/2021    Pneumococcal Conjugate 20-Valent (PCV20) 03/02/2023    Tdap 07/10/2014       Review of Systems   Constitutional:  Negative for fatigue.   HENT:  Negative for congestion and sore throat.    Eyes:  Negative for blurred vision.   Respiratory:  Negative for cough and shortness of breath.    Cardiovascular:  Negative for chest pain, palpitations and leg swelling.  "  Gastrointestinal:  Negative for abdominal pain and blood in stool.   Genitourinary:  Negative for breast lump, dysuria, vaginal bleeding and vaginal discharge.   Musculoskeletal:  Negative for arthralgias.   Skin:  Negative for rash.   Neurological:  Negative for headache.   Psychiatric/Behavioral:  Negative for depressed mood.         Objective     Vitals:    09/12/24 1050   BP: 131/57   BP Location: Left arm   Patient Position: Sitting   Pulse: 75   Temp: 98.1 °F (36.7 °C)   TempSrc: Oral   Weight: 91.2 kg (201 lb)   Height: 160 cm (63\")            Physical Exam  Vitals reviewed.   Constitutional:       General: She is not in acute distress.     Appearance: She is well-developed.   Cardiovascular:      Rate and Rhythm: Normal rate and regular rhythm.      Heart sounds: Normal heart sounds.   Pulmonary:      Effort: Pulmonary effort is normal. No respiratory distress.      Breath sounds: Normal breath sounds.   Chest:   Breasts:     Breasts are symmetrical.      Right: Normal. No mass, nipple discharge, skin change or tenderness.      Left: Normal. No mass, nipple discharge, skin change or tenderness.   Genitourinary:     General: Normal vulva.      Vagina: Normal. No vaginal discharge or lesions.      Cervix: Normal.      Uterus: Normal.       Adnexa: Right adnexa normal and left adnexa normal.      Rectum: Normal. Guaiac result negative. No mass.   Musculoskeletal:      Right lower leg: No edema.      Left lower leg: No edema.      Comments: Normal tone strength   Lymphadenopathy:      Upper Body:      Right upper body: No axillary adenopathy.      Left upper body: No axillary adenopathy.   Skin:     General: Skin is warm and dry.      Findings: No lesion or rash.   Neurological:      Mental Status: She is alert and oriented to person, place, and time.   Psychiatric:         Attention and Perception: Attention normal.         Mood and Affect: Mood normal.         Behavior: Behavior normal.         Result Review " :     The following data was reviewed by: PHILIP Jameson on 09/12/2024:                       Assessment and Plan      Diagnoses and all orders for this visit:    1. Routine gynecological examination (Primary)  Assessment & Plan:  Reviewed her chart, no pap reports, reviewed last note from 2020 that she had a GYN exam     Orders:  -     IGP, Aptima HPV, Rfx 16 / 18,45    2. Screening mammogram for breast cancer  Assessment & Plan:  continue monthly BSE, setting up her mammogram when due     Orders:  -     Mammo Screening Digital Tomosynthesis Bilateral With CAD; Future                21 minutes spent in reviewing chart, previous dg testing, notes, obtaining history, doing exam and ordering testing, documenting   Follow Up     Return for follow up for her next scheduled follow up with her PCP when due .    Patient was given instructions and counseling regarding her condition or for health maintenance advice. Please see specific information pulled into the AVS if appropriate.

## 2024-09-16 LAB
CYTOLOGIST CVX/VAG CYTO: NORMAL
CYTOLOGY CVX/VAG DOC CYTO: NORMAL
CYTOLOGY CVX/VAG DOC THIN PREP: NORMAL
DX ICD CODE: NORMAL
HPV GENOTYPE REFLEX: NORMAL
HPV I/H RISK 4 DNA CVX QL PROBE+SIG AMP: NEGATIVE
Lab: NORMAL
OTHER STN SPEC: NORMAL
STAT OF ADQ CVX/VAG CYTO-IMP: NORMAL

## 2024-10-03 DIAGNOSIS — E78.00 HIGH CHOLESTEROL: ICD-10-CM

## 2024-10-03 RX ORDER — FENOFIBRATE 145 MG/1
145 TABLET, COATED ORAL DAILY
Qty: 90 TABLET | Refills: 0 | Status: SHIPPED | OUTPATIENT
Start: 2024-10-03

## 2024-10-17 ENCOUNTER — OFFICE VISIT (OUTPATIENT)
Dept: FAMILY MEDICINE CLINIC | Age: 69
End: 2024-10-17
Payer: MEDICARE

## 2024-10-17 VITALS
HEART RATE: 75 BPM | TEMPERATURE: 98.2 F | DIASTOLIC BLOOD PRESSURE: 53 MMHG | OXYGEN SATURATION: 95 % | HEIGHT: 63 IN | WEIGHT: 201.4 LBS | BODY MASS INDEX: 35.68 KG/M2 | SYSTOLIC BLOOD PRESSURE: 128 MMHG

## 2024-10-17 DIAGNOSIS — E11.9 TYPE 2 DIABETES MELLITUS TREATED WITHOUT INSULIN: ICD-10-CM

## 2024-10-17 DIAGNOSIS — R42 EPISODE OF DIZZINESS: ICD-10-CM

## 2024-10-17 DIAGNOSIS — K21.9 GERD WITHOUT ESOPHAGITIS: ICD-10-CM

## 2024-10-17 DIAGNOSIS — I10 ESSENTIAL HYPERTENSION: Primary | ICD-10-CM

## 2024-10-17 PROBLEM — Z12.31 SCREENING MAMMOGRAM FOR BREAST CANCER: Status: RESOLVED | Noted: 2024-09-12 | Resolved: 2024-10-17

## 2024-10-17 PROBLEM — Z01.419 ROUTINE GYNECOLOGICAL EXAMINATION: Status: RESOLVED | Noted: 2024-09-12 | Resolved: 2024-10-17

## 2024-10-17 PROCEDURE — 3078F DIAST BP <80 MM HG: CPT | Performed by: FAMILY MEDICINE

## 2024-10-17 PROCEDURE — 1126F AMNT PAIN NOTED NONE PRSNT: CPT | Performed by: FAMILY MEDICINE

## 2024-10-17 PROCEDURE — 1160F RVW MEDS BY RX/DR IN RCRD: CPT | Performed by: FAMILY MEDICINE

## 2024-10-17 PROCEDURE — 99214 OFFICE O/P EST MOD 30 MIN: CPT | Performed by: FAMILY MEDICINE

## 2024-10-17 PROCEDURE — G2211 COMPLEX E/M VISIT ADD ON: HCPCS | Performed by: FAMILY MEDICINE

## 2024-10-17 PROCEDURE — 3074F SYST BP LT 130 MM HG: CPT | Performed by: FAMILY MEDICINE

## 2024-10-17 PROCEDURE — 3051F HG A1C>EQUAL 7.0%<8.0%: CPT | Performed by: FAMILY MEDICINE

## 2024-10-17 PROCEDURE — 1159F MED LIST DOCD IN RCRD: CPT | Performed by: FAMILY MEDICINE

## 2024-10-17 NOTE — PROGRESS NOTES
Chief Complaint  Hypertension (6m follow up)    Subjective          Rivka Casillas presents to [facility]  History of Present Illness  --NO RECURRENCE OF THE DIZZINESS, CARDIOLOGY PERFORMED AN ECHO, RESULTS PENDING, AND WILL SEE HER BACK NEXT WEEK, SHE CONTINUES ON THE ADDITIONAL AMLODIPINE AND CARVEDILOL   --GERD IS WELL CONTROLLED WITH THE PPI  --LAST HGA1C WAS 7.3 % DUE FOR A FOOT EXAM        No Known Allergies     Health Maintenance Due   Topic Date Due    INFLUENZA VACCINE  08/01/2024    COVID-19 Vaccine (5 - 2023-24 season) 09/01/2024    DIABETIC FOOT EXAM  09/07/2024    HEMOGLOBIN A1C  01/16/2025        Current Outpatient Medications on File Prior to Visit   Medication Sig    amLODIPine (NORVASC) 5 MG tablet Take 1 tablet by mouth Daily.    ascorbic acid (VITAMIN C) 1000 MG tablet     aspirin 81 MG EC tablet Take 1 tablet by mouth Daily.    Blood Glucose Monitoring Suppl device Check blood sugar twice daily.    Calcium Citrate 1040 MG tablet     carvedilol (COREG) 3.125 MG tablet Take 1 tablet by mouth 2 (Two) Times a Day With Meals.    Cholecalciferol 50 MCG (2000 UT) capsule Vitamin D3 2,000 unit oral capsule take 1 capsule by oral route daily   Active    fenofibrate (TRICOR) 145 MG tablet TAKE 1 TABLET DAILY    glucose blood test strip 1 each by Other route 2 (Two) Times a Day.    Lancets misc Check blood sugar twice daily.    lisinopril-hydrochlorothiazide (PRINZIDE,ZESTORETIC) 20-25 MG per tablet Take 1 tablet by mouth Daily.    meloxicam (MOBIC) 15 MG tablet 1 tablet Daily As Needed.    metFORMIN (GLUCOPHAGE) 500 MG tablet TAKE 1 TABLET TWICE A DAY WITH MEALS    omeprazole (priLOSEC) 40 MG capsule Take 1 capsule by mouth.     No current facility-administered medications on file prior to visit.       Immunization History   Administered Date(s) Administered    COVID-19 (PFIZER) Purple Cap Monovalent 03/31/2021, 04/21/2021    Covid-19 (Pfizer) Gray Cap Monovalent  "02/28/2022, 07/25/2022    Hepatitis A 12/28/2018, 07/13/2019    Influenza, Unspecified 02/12/2021    Pneumococcal Conjugate 20-Valent (PCV20) 03/02/2023    Tdap 07/10/2014       Review of Systems   Constitutional:  Negative for activity change, appetite change, chills, fatigue and fever.   HENT:  Negative for congestion, ear pain, rhinorrhea and sore throat.    Respiratory:  Negative for cough and shortness of breath.    Cardiovascular:  Negative for chest pain, palpitations and leg swelling.   Gastrointestinal:  Negative for abdominal pain, constipation, diarrhea, nausea and vomiting.   Musculoskeletal:  Negative for arthralgias and myalgias.   Neurological:  Negative for headache.        Objective     /53 (BP Location: Left arm, Patient Position: Sitting)   Pulse 75   Temp 98.2 °F (36.8 °C) (Oral)   Ht 160 cm (63\")   Wt 91.4 kg (201 lb 6.4 oz)   SpO2 95% Comment: ROOM AIR  BMI 35.68 kg/m²       Physical Exam  Vitals and nursing note reviewed.   Constitutional:       General: She is not in acute distress.     Appearance: Normal appearance.   Cardiovascular:      Rate and Rhythm: Normal rate and regular rhythm.      Pulses:           Dorsalis pedis pulses are 2+ on the right side and 2+ on the left side.        Posterior tibial pulses are 2+ on the right side and 2+ on the left side.      Heart sounds: Normal heart sounds. No murmur heard.  Pulmonary:      Effort: Pulmonary effort is normal.      Breath sounds: Normal breath sounds.   Abdominal:      Palpations: Abdomen is soft.      Tenderness: There is no abdominal tenderness.   Musculoskeletal:      Cervical back: Neck supple.      Right lower leg: No edema.      Left lower leg: No edema.      Right foot: Normal range of motion. No deformity, bunion, Charcot foot, foot drop or prominent metatarsal heads.      Left foot: Normal range of motion. No deformity, bunion, Charcot foot, foot drop or prominent metatarsal heads.   Feet:      Right foot:      " Protective Sensation: 4 sites tested.  4 sites sensed.      Skin integrity: Skin integrity normal.      Toenail Condition: Right toenails are normal.      Left foot:      Protective Sensation: 4 sites tested.  4 sites sensed.      Skin integrity: Skin integrity normal.      Toenail Condition: Left toenails are normal.      Comments: Diabetic Foot Exam Performed and Monofilament Test Performed     Lymphadenopathy:      Cervical: No cervical adenopathy.   Neurological:      General: No focal deficit present.      Mental Status: She is alert.      Cranial Nerves: No cranial nerve deficit.      Coordination: Coordination normal.      Gait: Gait normal.   Psychiatric:         Mood and Affect: Mood normal.         Behavior: Behavior normal.         Result Review :                             Assessment and Plan      Diagnoses and all orders for this visit:    1. Essential hypertension (Primary)  Assessment & Plan:  Hypertension is stable and controlled  Continue current treatment regimen.  Blood pressure will be reassessed in 6 months  CONTINUE THE NEW MEDS AS PRESCRIBED.  .      2. GERD without esophagitis  Assessment & Plan:  IMPROVED WITH CURRENT TREATMENT, CONTINUE SAME, WILL REEVALUATE AT NEXT VISIT       3. Episode of dizziness  Assessment & Plan:  F/U WITH CARDIOLOGY AS PLANNED, CONSIDER FURTHER WORKUP IF RECURRENT       4. Type 2 diabetes mellitus treated without insulin  Assessment & Plan:  Diabetes is stable.   Continue current treatment regimen.  Diabetes will be reassessed in 6 months                      Follow Up     Return in about 6 months (around 4/17/2025).    Patient was given instructions and counseling regarding her condition or for health maintenance advice. Please see specific information pulled into the AVS if appropriate.

## 2024-10-17 NOTE — ASSESSMENT & PLAN NOTE
Hypertension is stable and controlled  Continue current treatment regimen.  Blood pressure will be reassessed in 6 months  CONTINUE THE NEW MEDS AS PRESCRIBED.  .

## 2024-10-28 DIAGNOSIS — I10 ESSENTIAL HYPERTENSION: ICD-10-CM

## 2024-10-28 RX ORDER — AMLODIPINE BESYLATE 5 MG/1
5 TABLET ORAL DAILY
Qty: 90 TABLET | Refills: 1 | Status: SHIPPED | OUTPATIENT
Start: 2024-10-28

## 2024-11-07 ENCOUNTER — HOSPITAL ENCOUNTER (OUTPATIENT)
Dept: MAMMOGRAPHY | Facility: HOSPITAL | Age: 69
Discharge: HOME | End: 2024-11-07
Admitting: NURSE PRACTITIONER
Payer: MEDICARE

## 2024-11-07 DIAGNOSIS — Z12.31 SCREENING MAMMOGRAM FOR BREAST CANCER: ICD-10-CM

## 2024-11-07 PROCEDURE — 77067 SCR MAMMO BI INCL CAD: CPT

## 2024-11-07 PROCEDURE — 77063 BREAST TOMOSYNTHESIS BI: CPT

## 2025-01-01 DIAGNOSIS — E78.00 HIGH CHOLESTEROL: ICD-10-CM

## 2025-01-02 RX ORDER — FENOFIBRATE 145 MG/1
145 TABLET, COATED ORAL DAILY
Qty: 90 TABLET | Refills: 0 | Status: SHIPPED | OUTPATIENT
Start: 2025-01-02

## 2025-02-17 DIAGNOSIS — I10 ESSENTIAL HYPERTENSION: ICD-10-CM

## 2025-02-17 RX ORDER — LISINOPRIL AND HYDROCHLOROTHIAZIDE 20; 25 MG/1; MG/1
1 TABLET ORAL DAILY
Qty: 90 TABLET | Refills: 0 | Status: SHIPPED | OUTPATIENT
Start: 2025-02-17

## 2025-04-01 DIAGNOSIS — E78.00 HIGH CHOLESTEROL: ICD-10-CM

## 2025-04-01 RX ORDER — FENOFIBRATE 145 MG/1
145 TABLET, COATED ORAL DAILY
Qty: 90 TABLET | Refills: 3 | Status: SHIPPED | OUTPATIENT
Start: 2025-04-01

## 2025-04-01 NOTE — TELEPHONE ENCOUNTER
Rx Refill Note  Requested Prescriptions     Pending Prescriptions Disp Refills    fenofibrate (TRICOR) 145 MG tablet [Pharmacy Med Name: FENOFIBRATE TABS 145MG] 90 tablet 3     Sig: TAKE 1 TABLET DAILY      Last office visit with prescribing clinician: 10/17/2024   Last telemedicine visit with prescribing clinician: Visit date not found   Next office visit with prescribing clinician: 4/10/2025  Fibrate Protocol Failed   Lipid Panel (03/07/2024 09:03)     Zuri Hernandez LPN  04/01/25, 08:45 EDT

## 2025-04-10 ENCOUNTER — OFFICE VISIT (OUTPATIENT)
Dept: FAMILY MEDICINE CLINIC | Age: 70
End: 2025-04-10
Payer: MEDICARE

## 2025-04-10 ENCOUNTER — LAB (OUTPATIENT)
Dept: LAB | Facility: HOSPITAL | Age: 70
End: 2025-04-10
Payer: MEDICARE

## 2025-04-10 VITALS
WEIGHT: 200.2 LBS | HEART RATE: 75 BPM | OXYGEN SATURATION: 95 % | HEIGHT: 63 IN | TEMPERATURE: 98.4 F | BODY MASS INDEX: 35.47 KG/M2 | SYSTOLIC BLOOD PRESSURE: 135 MMHG | DIASTOLIC BLOOD PRESSURE: 82 MMHG

## 2025-04-10 DIAGNOSIS — E78.00 HIGH CHOLESTEROL: ICD-10-CM

## 2025-04-10 DIAGNOSIS — E11.9 TYPE 2 DIABETES MELLITUS TREATED WITHOUT INSULIN: ICD-10-CM

## 2025-04-10 DIAGNOSIS — I10 ESSENTIAL HYPERTENSION: ICD-10-CM

## 2025-04-10 DIAGNOSIS — Z00.00 MEDICARE ANNUAL WELLNESS VISIT, SUBSEQUENT: Primary | ICD-10-CM

## 2025-04-10 DIAGNOSIS — K21.9 GERD WITHOUT ESOPHAGITIS: ICD-10-CM

## 2025-04-10 PROBLEM — R42 EPISODE OF DIZZINESS: Status: RESOLVED | Noted: 2024-08-30 | Resolved: 2025-04-10

## 2025-04-10 LAB
ALBUMIN SERPL-MCNC: 4.2 G/DL (ref 3.5–5.2)
ALBUMIN UR-MCNC: <1.2 MG/DL
ALBUMIN/GLOB SERPL: 1.4 G/DL
ALP SERPL-CCNC: 51 U/L (ref 39–117)
ALT SERPL W P-5'-P-CCNC: 20 U/L (ref 1–33)
ANION GAP SERPL CALCULATED.3IONS-SCNC: 9.5 MMOL/L (ref 5–15)
AST SERPL-CCNC: 21 U/L (ref 1–32)
BILIRUB SERPL-MCNC: 0.5 MG/DL (ref 0–1.2)
BILIRUB UR QL STRIP: NEGATIVE
BUN SERPL-MCNC: 19 MG/DL (ref 8–23)
BUN/CREAT SERPL: 21.3 (ref 7–25)
CALCIUM SPEC-SCNC: 9.7 MG/DL (ref 8.6–10.5)
CHLORIDE SERPL-SCNC: 104 MMOL/L (ref 98–107)
CHOLEST SERPL-MCNC: 171 MG/DL (ref 0–200)
CLARITY UR: CLEAR
CO2 SERPL-SCNC: 28.5 MMOL/L (ref 22–29)
COLOR UR: YELLOW
CREAT SERPL-MCNC: 0.89 MG/DL (ref 0.57–1)
CREAT UR-MCNC: 44.1 MG/DL
DEPRECATED RDW RBC AUTO: 40.3 FL (ref 37–54)
EGFRCR SERPLBLD CKD-EPI 2021: 70.3 ML/MIN/1.73
ERYTHROCYTE [DISTWIDTH] IN BLOOD BY AUTOMATED COUNT: 12.3 % (ref 12.3–15.4)
GLOBULIN UR ELPH-MCNC: 2.9 GM/DL
GLUCOSE SERPL-MCNC: 179 MG/DL (ref 65–99)
GLUCOSE UR STRIP-MCNC: NEGATIVE MG/DL
HBA1C MFR BLD: 7.3 % (ref 4.8–5.6)
HCT VFR BLD AUTO: 41.6 % (ref 34–46.6)
HDLC SERPL-MCNC: 60 MG/DL (ref 40–60)
HGB BLD-MCNC: 13.7 G/DL (ref 12–15.9)
HGB UR QL STRIP.AUTO: NEGATIVE
KETONES UR QL STRIP: NEGATIVE
LDLC SERPL CALC-MCNC: 93 MG/DL (ref 0–100)
LDLC/HDLC SERPL: 1.52 {RATIO}
LEUKOCYTE ESTERASE UR QL STRIP.AUTO: NEGATIVE
MCH RBC QN AUTO: 29.5 PG (ref 26.6–33)
MCHC RBC AUTO-ENTMCNC: 32.9 G/DL (ref 31.5–35.7)
MCV RBC AUTO: 89.5 FL (ref 79–97)
MICROALBUMIN/CREAT UR: NORMAL MG/G{CREAT}
NITRITE UR QL STRIP: NEGATIVE
PH UR STRIP.AUTO: 7 [PH] (ref 5–8)
PLATELET # BLD AUTO: 284 10*3/MM3 (ref 140–450)
PMV BLD AUTO: 9.6 FL (ref 6–12)
POTASSIUM SERPL-SCNC: 4.1 MMOL/L (ref 3.5–5.2)
PROT SERPL-MCNC: 7.1 G/DL (ref 6–8.5)
PROT UR QL STRIP: NEGATIVE
RBC # BLD AUTO: 4.65 10*6/MM3 (ref 3.77–5.28)
SODIUM SERPL-SCNC: 142 MMOL/L (ref 136–145)
SP GR UR STRIP: 1.02 (ref 1–1.03)
TRIGL SERPL-MCNC: 100 MG/DL (ref 0–150)
TSH SERPL DL<=0.05 MIU/L-ACNC: 1.74 UIU/ML (ref 0.27–4.2)
UROBILINOGEN UR QL STRIP: NORMAL
VLDLC SERPL-MCNC: 18 MG/DL (ref 5–40)
WBC NRBC COR # BLD AUTO: 6.59 10*3/MM3 (ref 3.4–10.8)

## 2025-04-10 PROCEDURE — 83036 HEMOGLOBIN GLYCOSYLATED A1C: CPT | Performed by: FAMILY MEDICINE

## 2025-04-10 PROCEDURE — 81003 URINALYSIS AUTO W/O SCOPE: CPT | Performed by: FAMILY MEDICINE

## 2025-04-10 PROCEDURE — 82570 ASSAY OF URINE CREATININE: CPT | Performed by: FAMILY MEDICINE

## 2025-04-10 PROCEDURE — 80053 COMPREHEN METABOLIC PANEL: CPT | Performed by: FAMILY MEDICINE

## 2025-04-10 PROCEDURE — 36415 COLL VENOUS BLD VENIPUNCTURE: CPT | Performed by: FAMILY MEDICINE

## 2025-04-10 PROCEDURE — 82043 UR ALBUMIN QUANTITATIVE: CPT | Performed by: FAMILY MEDICINE

## 2025-04-10 PROCEDURE — 80061 LIPID PANEL: CPT | Performed by: FAMILY MEDICINE

## 2025-04-10 PROCEDURE — 84443 ASSAY THYROID STIM HORMONE: CPT | Performed by: FAMILY MEDICINE

## 2025-04-10 PROCEDURE — 85027 COMPLETE CBC AUTOMATED: CPT | Performed by: FAMILY MEDICINE

## 2025-04-10 NOTE — PROGRESS NOTES
Subjective   The ABCs of the Annual Wellness Visit  Medicare Wellness Visit      Rivka Casillas is a 69 y.o. patient who presents for a Medicare Wellness Visit.    The following portions of the patient's history were reviewed and   updated as appropriate: allergies, current medications, past family history, past medical history, past social history, past surgical history, and problem list.    Compared to one year ago, the patient's physical   health is the same.  Compared to one year ago, the patient's mental   health is the same.    Recent Hospitalizations:  She was not admitted to the hospital during the last year.     Current Medical Providers:  Patient Care Team:  Jono Lowe MD as PCP - General (Family Medicine)    Outpatient Medications Prior to Visit   Medication Sig Dispense Refill    amLODIPine (NORVASC) 5 MG tablet TAKE 1 TABLET BY MOUTH DAILY 90 tablet 1    ascorbic acid (VITAMIN C) 1000 MG tablet       aspirin 81 MG EC tablet Take 1 tablet by mouth Daily.      Blood Glucose Monitoring Suppl device Check blood sugar twice daily. 1 each 0    Calcium Citrate 1040 MG tablet       carvedilol (COREG) 3.125 MG tablet Take 1 tablet by mouth 2 (Two) Times a Day With Meals.      Cholecalciferol 50 MCG (2000 UT) capsule Vitamin D3 2,000 unit oral capsule take 1 capsule by oral route daily   Active      fenofibrate (TRICOR) 145 MG tablet TAKE 1 TABLET DAILY 90 tablet 3    glucose blood test strip 1 each by Other route 2 (Two) Times a Day. 200 each 3    Lancets misc Check blood sugar twice daily. 200 each 3    lisinopril-hydrochlorothiazide (PRINZIDE,ZESTORETIC) 20-25 MG per tablet TAKE 1 TABLET DAILY 90 tablet 0    meloxicam (MOBIC) 15 MG tablet 1 tablet Daily As Needed.      metFORMIN (GLUCOPHAGE) 500 MG tablet TAKE 1 TABLET TWICE A DAY WITH MEALS 180 tablet 3    omeprazole (priLOSEC) 40 MG capsule Take 1 capsule by mouth.       No facility-administered medications prior to visit.     No opioid  "medication identified on active medication list. I have reviewed chart for other potential  high risk medication/s and harmful drug interactions in the elderly.      Aspirin is on active medication list. Aspirin use is indicated based on review of current medical condition/s. Pros and cons of this therapy have been discussed today. Benefits of this medication outweigh potential harm.  Patient has been encouraged to continue taking this medication.  .      Patient Active Problem List   Diagnosis    Type 2 diabetes mellitus treated without insulin    Essential hypertension    High cholesterol    GERD without esophagitis    Medicare annual wellness visit, subsequent    Generalized osteoarthritis     Advance Care Planning Advance Directive is not on file.  ACP discussion was held with the patient during this visit. Patient does not have an advance directive, information provided.            Objective   Vitals:    04/10/25 0856   BP: 135/82   BP Location: Left arm   Patient Position: Sitting   Pulse: 75   Temp: 98.4 °F (36.9 °C)   TempSrc: Oral   SpO2: 95%  Comment: on RA   Weight: 90.8 kg (200 lb 3.2 oz)   Height: 160 cm (63\")   PainSc: 0-No pain       Estimated body mass index is 35.46 kg/m² as calculated from the following:    Height as of this encounter: 160 cm (63\").    Weight as of this encounter: 90.8 kg (200 lb 3.2 oz).    Class 2 Severe Obesity (BMI >=35 and <=39.9). Obesity-related health conditions include the following: hypertension. Obesity is unchanged. BMI is is above average; BMI management plan is completed. We discussed portion control and increasing exercise.           Does the patient have evidence of cognitive impairment? No                                                                                                Health  Risk Assessment    Smoking Status:  Social History     Tobacco Use   Smoking Status Never   Smokeless Tobacco Never     Alcohol Consumption:  Social History     Substance and " Sexual Activity   Alcohol Use Never       Fall Risk Screen  STEADI Fall Risk Assessment was completed, and patient is at LOW risk for falls.Assessment completed on:4/10/2025    Depression Screening   Little interest or pleasure in doing things? Not at all   Feeling down, depressed, or hopeless? Not at all   PHQ-2 Total Score 0      Health Habits and Functional and Cognitive Screenin/10/2025     8:55 AM   Functional & Cognitive Status   Do you have difficulty preparing food and eating? No   Do you have difficulty bathing yourself, getting dressed or grooming yourself? No   Do you have difficulty using the toilet? No   Do you have difficulty moving around from place to place? No   Do you have trouble with steps or getting out of a bed or a chair? No   Current Diet Well Balanced Diet   Dental Exam Up to date   Eye Exam Up to date   Exercise (times per week) 5 times per week   Current Exercises Include Walking;House Cleaning;Other   Do you need help using the phone?  No   Are you deaf or do you have serious difficulty hearing?  No   Do you need help to go to places out of walking distance? No   Do you need help shopping? No   Do you need help preparing meals?  No   Do you need help with housework?  No   Do you need help with laundry? No   Do you need help taking your medications? No   Do you need help managing money? No   Do you ever drive or ride in a car without wearing a seat belt? No   Have you felt unusual stress, anger or loneliness in the last month? No   Who do you live with? Spouse   If you need help, do you have trouble finding someone available to you? No   Have you been bothered in the last four weeks by sexual problems? No   Do you have difficulty concentrating, remembering or making decisions? No           Age-appropriate Screening Schedule:  Refer to the list below for future screening recommendations based on patient's age, sex and/or medical conditions. Orders for these recommended tests are  listed in the plan section. The patient has been provided with a written plan.    Health Maintenance List  Health Maintenance   Topic Date Due    COVID-19 Vaccine (5 - 2024-25 season) 09/01/2024    HEMOGLOBIN A1C  01/16/2025    DIABETIC EYE EXAM  02/15/2025    LIPID PANEL  03/07/2025    URINE MICROALBUMIN-CREATININE RATIO (uACR)  03/07/2025    TDAP/TD VACCINES (2 - Td or Tdap) 08/28/2025 (Originally 7/10/2024)    ZOSTER VACCINE (1 of 2) 09/07/2025 (Originally 7/30/2005)    INFLUENZA VACCINE  07/01/2025    DIABETIC FOOT EXAM  10/17/2025    DXA SCAN  10/26/2025    ANNUAL WELLNESS VISIT  04/10/2026    MAMMOGRAM  11/07/2026    COLORECTAL CANCER SCREENING  06/13/2028    HEPATITIS C SCREENING  Completed    Pneumococcal Vaccine 50+  Completed                                                                                                                                                CMS Preventative Services Quick Reference  Risk Factors Identified During Encounter  None Identified    The above risks/problems have been discussed with the patient.  Pertinent information has been shared with the patient in the After Visit Summary.  An After Visit Summary and PPPS were made available to the patient.    Follow Up:   Next Medicare Wellness visit to be scheduled in 1 year.         Additional E&M Note during same encounter follows:  Patient has additional, significant, and separately identifiable condition(s)/problem(s) that require work above and beyond the Medicare Wellness Visit     Chief Complaint  Medicare Wellness-subsequent    Subjective   --TOLERATING BLOOD PRESSURE MEDICATON WITHOUT APPARENT SIDE EFFECTS  --GERD IS WELL CONTROLLED WITH THE PPI  --LAST LIPIDS WERE OK WITH THE FENOFIBRATE  --HGA1C WAS 7.3 % AT LAST DRAW          Review of Systems   Constitutional:  Negative for activity change, appetite change, chills, fatigue and fever.   HENT:  Negative for congestion, ear pain, hearing loss, rhinorrhea and sore throat.   "  Eyes:  Negative for discharge and visual disturbance.   Respiratory:  Negative for cough and shortness of breath.    Cardiovascular:  Negative for chest pain, palpitations and leg swelling.   Gastrointestinal:  Negative for abdominal pain, diarrhea, nausea and vomiting.   Genitourinary:  Negative for dysuria and hematuria.   Musculoskeletal:  Negative for arthralgias and myalgias.   Psychiatric/Behavioral:  Negative for dysphoric mood.               Objective   Vital Signs:  /82 (BP Location: Left arm, Patient Position: Sitting)   Pulse 75   Temp 98.4 °F (36.9 °C) (Oral)   Ht 160 cm (63\")   Wt 90.8 kg (200 lb 3.2 oz)   SpO2 95% Comment: on RA  BMI 35.46 kg/m²   Physical Exam  Vitals and nursing note reviewed.   Constitutional:       General: She is not in acute distress.     Appearance: Normal appearance.   HENT:      Right Ear: Tympanic membrane normal.      Left Ear: Tympanic membrane normal.      Mouth/Throat:      Pharynx: Oropharynx is clear.   Eyes:      Conjunctiva/sclera: Conjunctivae normal.   Cardiovascular:      Rate and Rhythm: Normal rate and regular rhythm.      Heart sounds: Normal heart sounds. No murmur heard.  Pulmonary:      Effort: Pulmonary effort is normal.      Breath sounds: Normal breath sounds.   Abdominal:      General: Bowel sounds are normal.      Palpations: Abdomen is soft.      Tenderness: There is no abdominal tenderness.   Musculoskeletal:      Cervical back: Neck supple.      Right lower leg: No edema.      Left lower leg: No edema.   Lymphadenopathy:      Cervical: No cervical adenopathy.   Neurological:      General: No focal deficit present.      Mental Status: She is alert.      Cranial Nerves: No cranial nerve deficit.      Coordination: Coordination normal.      Gait: Gait normal.   Psychiatric:         Mood and Affect: Mood normal.         Behavior: Behavior normal.                    Assessment and Plan      Essential hypertension  Hypertension is stable and " controlled  Continue current treatment regimen.  Blood pressure will be reassessed in 6 months.    Orders:    TSH Rfx On Abnormal To Free T4    Urinalysis With Culture If Indicated -    GERD without esophagitis  IMPROVED WITH CURRENT TREATMENT, WILL REEVALUATE AT NEXT VISIT     Orders:    CBC (No Diff)    High cholesterol   Lipid abnormalities are stable    Plan:  Continue same medication/s without change.      Discussed medication dosage, use, side effects, and goals of treatment in detail.    Counseled patient on lifestyle modifications to help control hyperlipidemia.     Patient Treatment Goals:   LDL goal is less than 70    Followup in 6 months.    Orders:    Lipid Panel    Medicare annual wellness visit, subsequent  ADVICE GIVEN RE:  SEATBELT USE, ALCOHOL USE, HEALTHY DIET, ROUTINE EYE AND DENTAL EXAM, ROUTINE VACCINATIONS.           Type 2 diabetes mellitus treated without insulin  Diabetes is stable.   Continue current treatment regimen.  Diabetes will be reassessed in 6 months  NOT QUITE AT GOAL, WILL CHECK LABS AS NOTED AND PROCEED AS INDICATED     Orders:    Comprehensive Metabolic Panel    Hemoglobin A1c    Microalbumin / Creatinine Urine Ratio - Urine, Clean Catch            Follow Up   Return in about 6 months (around 10/10/2025).  Patient was given instructions and counseling regarding her condition or for health maintenance advice. Please see specific information pulled into the AVS if appropriate.

## 2025-04-10 NOTE — ASSESSMENT & PLAN NOTE
Diabetes is stable.   Continue current treatment regimen.  Diabetes will be reassessed in 6 months  NOT QUITE AT GOAL, WILL CHECK LABS AS NOTED AND PROCEED AS INDICATED     Orders:    Comprehensive Metabolic Panel    Hemoglobin A1c    Microalbumin / Creatinine Urine Ratio - Urine, Clean Catch

## 2025-04-10 NOTE — ASSESSMENT & PLAN NOTE
Hypertension is stable and controlled  Continue current treatment regimen.  Blood pressure will be reassessed in 6 months.    Orders:    TSH Rfx On Abnormal To Free T4    Urinalysis With Culture If Indicated -

## 2025-04-21 DIAGNOSIS — I10 ESSENTIAL HYPERTENSION: ICD-10-CM

## 2025-04-21 RX ORDER — AMLODIPINE BESYLATE 5 MG/1
5 TABLET ORAL DAILY
Qty: 90 TABLET | Refills: 1 | Status: SHIPPED | OUTPATIENT
Start: 2025-04-21

## 2025-04-21 NOTE — TELEPHONE ENCOUNTER
Caller: Rivka Casillas    Relationship: Self    Best call back number: [phone]     Requested Prescriptions:   Requested Prescriptions     Pending Prescriptions Disp Refills    amLODIPine (NORVASC) 5 MG tablet 90 tablet 1     Sig: Take 1 tablet by mouth Daily.        Pharmacy where request should be sent: [id number] [id number] [facility] [address] [place] [address] & [place] [address] - [phone] [facility] [phone] FX     Last office visit with prescribing clinician: 4/10/2025   Last telemedicine visit with prescribing clinician: Visit date not found   Next office visit with prescribing clinician: 10/10/2025     Additional details provided by patient:     Does the patient have less than a 3 day supply:  [] Yes  [x] No        Chika Diego, PCT   04/21/25 09:26 EDT

## 2025-05-19 DIAGNOSIS — I10 ESSENTIAL HYPERTENSION: ICD-10-CM

## 2025-05-19 RX ORDER — LISINOPRIL AND HYDROCHLOROTHIAZIDE 20; 25 MG/1; MG/1
1 TABLET ORAL DAILY
Qty: 90 TABLET | Refills: 3 | Status: SHIPPED | OUTPATIENT
Start: 2025-05-19

## 2025-06-27 DIAGNOSIS — E11.9 TYPE 2 DIABETES MELLITUS TREATED WITHOUT INSULIN: ICD-10-CM

## 2025-07-25 ENCOUNTER — TELEPHONE (OUTPATIENT)
Dept: FAMILY MEDICINE CLINIC | Age: 70
End: 2025-07-25

## 2025-07-25 ENCOUNTER — OFFICE VISIT (OUTPATIENT)
Dept: FAMILY MEDICINE CLINIC | Age: 70
End: 2025-07-25
Payer: MEDICARE

## 2025-07-25 VITALS
WEIGHT: 202.6 LBS | HEART RATE: 75 BPM | TEMPERATURE: 99 F | SYSTOLIC BLOOD PRESSURE: 139 MMHG | DIASTOLIC BLOOD PRESSURE: 84 MMHG | HEIGHT: 63 IN | BODY MASS INDEX: 35.9 KG/M2 | OXYGEN SATURATION: 96 %

## 2025-07-25 DIAGNOSIS — M79.18 PAIN OF LEFT DELTOID: ICD-10-CM

## 2025-07-25 DIAGNOSIS — K21.9 GERD WITHOUT ESOPHAGITIS: ICD-10-CM

## 2025-07-25 DIAGNOSIS — E78.00 HIGH CHOLESTEROL: ICD-10-CM

## 2025-07-25 DIAGNOSIS — E11.9 TYPE 2 DIABETES MELLITUS TREATED WITHOUT INSULIN: ICD-10-CM

## 2025-07-25 DIAGNOSIS — I10 ESSENTIAL HYPERTENSION: Primary | ICD-10-CM

## 2025-07-25 PROBLEM — Z00.00 MEDICARE ANNUAL WELLNESS VISIT, SUBSEQUENT: Status: RESOLVED | Noted: 2022-02-28 | Resolved: 2025-07-25

## 2025-07-25 NOTE — ASSESSMENT & PLAN NOTE
Diabetes is stable.   Continue current treatment regimen.  Diabetes will be reassessed in 6 months  NOT QUITE AT GOAL, WILL REPEAT LABS AT HER NEXT VISIT THIS FALL

## 2025-07-25 NOTE — PROGRESS NOTES
Chief Complaint  Arm Pain (Upper left arm pain x 3 weeks)    Subjective          Rivka Casillas presents to [facility]  History of Present Illness  --TOLERATING BLOOD PRESSURE MEDICATION WITHOUT APPARENT SIDE EFFECTS  --GERD IS WELL CONTROLLED WITH THE PPI  --LAST LIPIDS WERE OK, NO ISSUES WITH THE STATIN  --HGA1C WAS UP TO 7.3 %, METFORMIN WAS INCREASED, NO APPARENT SIDE EFFECTS THUS FAR  --ACHING IN THE LEFT UPPER ARM FOR THE PAST THREE WEEKS.  NO RADIATION, NUMBNESS OR WEAKNESS.  NO KNOWN TRAUMA.          No Known Allergies     Health Maintenance Due   Topic Date Due    COVID-19 Vaccine (5 - 2024-25 season) 09/01/2024    DIABETIC EYE EXAM  02/15/2025    HEMOGLOBIN A1C  10/10/2025        Current Outpatient Medications on File Prior to Visit   Medication Sig    amLODIPine (NORVASC) 5 MG tablet Take 1 tablet by mouth Daily.    ascorbic acid (VITAMIN C) 1000 MG tablet     aspirin 81 MG EC tablet Take 1 tablet by mouth Daily.    Blood Glucose Monitoring Suppl device Check blood sugar twice daily.    Calcium Citrate 1040 MG tablet     carvedilol (COREG) 3.125 MG tablet Take 1 tablet by mouth 2 (Two) Times a Day With Meals.    Cholecalciferol 50 MCG (2000 UT) capsule Vitamin D3 2,000 unit oral capsule take 1 capsule by oral route daily   Active    fenofibrate (TRICOR) 145 MG tablet TAKE 1 TABLET DAILY    glucose blood test strip 1 each by Other route 2 (Two) Times a Day.    Lancets misc Check blood sugar twice daily.    lisinopril-hydrochlorothiazide (PRINZIDE,ZESTORETIC) 20-25 MG per tablet TAKE 1 TABLET DAILY    meloxicam (MOBIC) 15 MG tablet 1 tablet Daily As Needed.    metFORMIN (GLUCOPHAGE) 1000 MG tablet TAKE 1 TABLET TWICE A DAY WITH MEALS    omeprazole (priLOSEC) 40 MG capsule Take 1 capsule by mouth.     No current facility-administered medications on file prior to visit.       Immunization History   Administered Date(s) Administered    COVID-19 (PFIZER) Purple Cap  "Monovalent 03/31/2021, 04/21/2021    Covid-19 (Pfizer) Gray Cap Monovalent 02/28/2022, 07/25/2022    Hepatitis A 12/28/2018, 07/13/2019    Influenza, Unspecified 02/12/2021    Pneumococcal Conjugate 20-Valent (PCV20) 03/02/2023    Tdap 07/10/2014       Review of Systems   Constitutional:  Negative for activity change, appetite change, chills, fatigue and fever.   HENT:  Negative for congestion, ear pain, rhinorrhea and sore throat.    Respiratory:  Negative for cough and shortness of breath.    Cardiovascular:  Negative for chest pain, palpitations and leg swelling.   Gastrointestinal:  Negative for abdominal pain, constipation, diarrhea, nausea and vomiting.   Neurological:  Negative for headache.        Objective     /84 (BP Location: Right arm, Patient Position: Sitting)   Pulse 75   Temp 99 °F (37.2 °C) (Oral)   Ht 160 cm (63\")   Wt 91.9 kg (202 lb 9.6 oz)   SpO2 96% Comment: on RA  BMI 35.89 kg/m²       Physical Exam  Vitals and nursing note reviewed.   Constitutional:       General: She is not in acute distress.     Appearance: Normal appearance.   Cardiovascular:      Rate and Rhythm: Normal rate and regular rhythm.      Heart sounds: Normal heart sounds. No murmur heard.  Pulmonary:      Effort: Pulmonary effort is normal.      Breath sounds: Normal breath sounds.   Abdominal:      Palpations: Abdomen is soft.      Tenderness: There is no abdominal tenderness.   Genitourinary:     Comments: LEFT SHOULDER, ELBOW AND WRIST WITH FULL ROM, SENSATION, PULSES AND STRENGTH.  TENDER AT LEFT DELTOID INSERTION   Musculoskeletal:      Cervical back: Normal range of motion and neck supple.      Right lower leg: No edema.      Left lower leg: No edema.   Lymphadenopathy:      Cervical: No cervical adenopathy.   Neurological:      General: No focal deficit present.      Mental Status: She is alert.      Cranial Nerves: No cranial nerve deficit.      Coordination: Coordination normal.      Gait: Gait normal. "   Psychiatric:         Mood and Affect: Mood normal.         Behavior: Behavior normal.         Result Review :                             Assessment and Plan      Diagnoses and all orders for this visit:    1. Essential hypertension (Primary)  Assessment & Plan:  Hypertension is stable and controlled  Continue current treatment regimen.  Blood pressure will be reassessed in 6 months.      2. GERD without esophagitis  Assessment & Plan:  IMPROVED WITH CURRENT TREATMENT, WILL REEVALUATE AT NEXT VISIT       3. High cholesterol  Assessment & Plan:   Lipid abnormalities are stable    Plan:  Continue same medication/s without change.      Discussed medication dosage, use, side effects, and goals of treatment in detail.    Counseled patient on lifestyle modifications to help control hyperlipidemia.     Patient Treatment Goals:   LDL goal is less than 70    Followup in 6 months.      4. Type 2 diabetes mellitus treated without insulin  Assessment & Plan:  Diabetes is stable.   Continue current treatment regimen.  Diabetes will be reassessed in 6 months  NOT QUITE AT GOAL, WILL REPEAT LABS AT HER NEXT VISIT THIS FALL       5. Pain of left deltoid  Comments:  NONSPECIFIC, POSSIBLE STRAIN, FOR NOW:  COLD PACKS AND USE THE MELOXICAM.  CONSIDER X-RAYS AND P.T. EVAL                    Follow Up     Return in about 3 months (around 10/25/2025).    Patient was given instructions and counseling regarding her condition or for health maintenance advice. Please see specific information pulled into the AVS if appropriate.

## 2025-08-07 ENCOUNTER — TELEPHONE (OUTPATIENT)
Dept: FAMILY MEDICINE CLINIC | Age: 70
End: 2025-08-07
Payer: MEDICARE